# Patient Record
Sex: FEMALE | Race: BLACK OR AFRICAN AMERICAN | Employment: OTHER | URBAN - METROPOLITAN AREA
[De-identification: names, ages, dates, MRNs, and addresses within clinical notes are randomized per-mention and may not be internally consistent; named-entity substitution may affect disease eponyms.]

---

## 2021-01-29 ENCOUNTER — HOSPITAL ENCOUNTER (OUTPATIENT)
Dept: LAB | Age: 51
Discharge: HOME | End: 2021-01-29
Payer: OTHER GOVERNMENT

## 2021-01-29 ENCOUNTER — TRANSCRIBE ORDER (OUTPATIENT)
Dept: REGISTRATION | Age: 51
End: 2021-01-29

## 2021-01-29 ENCOUNTER — HOSPITAL ENCOUNTER (OUTPATIENT)
Dept: GENERAL RADIOLOGY | Age: 51
Discharge: HOME | End: 2021-01-29
Payer: OTHER GOVERNMENT

## 2021-01-29 DIAGNOSIS — Z00.00 ROUTINE GENERAL MEDICAL EXAMINATION AT A HEALTH CARE FACILITY: ICD-10-CM

## 2021-01-29 DIAGNOSIS — Z00.00 ROUTINE GENERAL MEDICAL EXAMINATION AT A HEALTH CARE FACILITY: Primary | ICD-10-CM

## 2021-01-29 LAB
25(OH)D3 SERPL-MCNC: 30.8 NG/ML (ref 30–100)
ALBUMIN SERPL-MCNC: 3.9 G/DL (ref 3.4–5)
ALBUMIN/GLOB SERPL: 1 {RATIO} (ref 0.8–1.7)
ALP SERPL-CCNC: 41 U/L (ref 45–117)
ALT SERPL-CCNC: 19 U/L (ref 13–56)
ANION GAP SERPL CALC-SCNC: 2 MMOL/L (ref 3–18)
APPEARANCE UR: ABNORMAL
AST SERPL-CCNC: 10 U/L (ref 10–38)
BACTERIA URNS QL MICRO: ABNORMAL /HPF
BILIRUB SERPL-MCNC: 0.3 MG/DL (ref 0.2–1)
BILIRUB UR QL: NEGATIVE
BUN SERPL-MCNC: 16 MG/DL (ref 7–18)
BUN/CREAT SERPL: 22 (ref 12–20)
CALCIUM SERPL-MCNC: 9 MG/DL (ref 8.5–10.1)
CHLORIDE SERPL-SCNC: 104 MMOL/L (ref 100–111)
CHOLEST SERPL-MCNC: 208 MG/DL
CO2 SERPL-SCNC: 30 MMOL/L (ref 21–32)
COLOR UR: YELLOW
CREAT SERPL-MCNC: 0.72 MG/DL (ref 0.6–1.3)
EPITH CASTS URNS QL MICRO: ABNORMAL /LPF (ref 0–5)
ERYTHROCYTE [DISTWIDTH] IN BLOOD BY AUTOMATED COUNT: 12.8 % (ref 11.6–14.5)
GLOBULIN SER CALC-MCNC: 4 G/DL (ref 2–4)
GLUCOSE SERPL-MCNC: 83 MG/DL (ref 74–99)
GLUCOSE UR STRIP.AUTO-MCNC: NEGATIVE MG/DL
HBA1C MFR BLD: 5.4 % (ref 4.2–5.6)
HCT VFR BLD AUTO: 37.6 % (ref 35–45)
HDLC SERPL-MCNC: 80 MG/DL (ref 40–60)
HDLC SERPL: 2.6 {RATIO} (ref 0–5)
HGB BLD-MCNC: 11.8 G/DL (ref 12–16)
HGB UR QL STRIP: NEGATIVE
KETONES UR QL STRIP.AUTO: ABNORMAL MG/DL
LDLC SERPL CALC-MCNC: 117 MG/DL (ref 0–100)
LEUKOCYTE ESTERASE UR QL STRIP.AUTO: ABNORMAL
LIPID PROFILE,FLP: ABNORMAL
MCH RBC QN AUTO: 29.6 PG (ref 24–34)
MCHC RBC AUTO-ENTMCNC: 31.4 G/DL (ref 31–37)
MCV RBC AUTO: 94.5 FL (ref 74–97)
NITRITE UR QL STRIP.AUTO: NEGATIVE
PH UR STRIP: 6 [PH] (ref 5–8)
PLATELET # BLD AUTO: 256 K/UL (ref 135–420)
PMV BLD AUTO: 10.7 FL (ref 9.2–11.8)
POTASSIUM SERPL-SCNC: 4.5 MMOL/L (ref 3.5–5.5)
PROT SERPL-MCNC: 7.9 G/DL (ref 6.4–8.2)
PROT UR STRIP-MCNC: NEGATIVE MG/DL
RBC # BLD AUTO: 3.98 M/UL (ref 4.2–5.3)
RBC #/AREA URNS HPF: ABNORMAL /HPF (ref 0–5)
SODIUM SERPL-SCNC: 136 MMOL/L (ref 136–145)
SP GR UR REFRACTOMETRY: 1.02 (ref 1–1.03)
TRIGL SERPL-MCNC: 55 MG/DL (ref ?–150)
TSH SERPL DL<=0.05 MIU/L-ACNC: 0.69 UIU/ML (ref 0.36–3.74)
UROBILINOGEN UR QL STRIP.AUTO: 0.2 EU/DL (ref 0.2–1)
VIT B12 SERPL-MCNC: 431 PG/ML (ref 211–911)
VLDLC SERPL CALC-MCNC: 11 MG/DL
WBC # BLD AUTO: 4.4 K/UL (ref 4.6–13.2)
WBC URNS QL MICRO: ABNORMAL /HPF (ref 0–4)

## 2021-01-29 PROCEDURE — 80061 LIPID PANEL: CPT

## 2021-01-29 PROCEDURE — 36415 COLL VENOUS BLD VENIPUNCTURE: CPT

## 2021-01-29 PROCEDURE — 71046 X-RAY EXAM CHEST 2 VIEWS: CPT

## 2021-01-29 PROCEDURE — 82607 VITAMIN B-12: CPT

## 2021-01-29 PROCEDURE — 82306 VITAMIN D 25 HYDROXY: CPT

## 2021-01-29 PROCEDURE — 93005 ELECTROCARDIOGRAM TRACING: CPT

## 2021-01-29 PROCEDURE — 80053 COMPREHEN METABOLIC PANEL: CPT

## 2021-01-29 PROCEDURE — 84443 ASSAY THYROID STIM HORMONE: CPT

## 2021-01-29 PROCEDURE — 83036 HEMOGLOBIN GLYCOSYLATED A1C: CPT

## 2021-01-29 PROCEDURE — 81001 URINALYSIS AUTO W/SCOPE: CPT

## 2021-01-29 PROCEDURE — 85027 COMPLETE CBC AUTOMATED: CPT

## 2021-01-30 LAB
ATRIAL RATE: 56 BPM
CALCULATED P AXIS, ECG09: 74 DEGREES
CALCULATED R AXIS, ECG10: 49 DEGREES
CALCULATED T AXIS, ECG11: 59 DEGREES
DIAGNOSIS, 93000: NORMAL
P-R INTERVAL, ECG05: 160 MS
Q-T INTERVAL, ECG07: 420 MS
QRS DURATION, ECG06: 82 MS
QTC CALCULATION (BEZET), ECG08: 405 MS
VENTRICULAR RATE, ECG03: 56 BPM

## 2023-03-09 ENCOUNTER — APPOINTMENT (OUTPATIENT)
Facility: HOSPITAL | Age: 53
DRG: 281 | End: 2023-03-09
Payer: OTHER GOVERNMENT

## 2023-03-09 ENCOUNTER — HOSPITAL ENCOUNTER (INPATIENT)
Facility: HOSPITAL | Age: 53
LOS: 2 days | Discharge: HOME | DRG: 281 | End: 2023-03-11
Attending: STUDENT IN AN ORGANIZED HEALTH CARE EDUCATION/TRAINING PROGRAM | Admitting: INTERNAL MEDICINE
Payer: OTHER GOVERNMENT

## 2023-03-09 DIAGNOSIS — I21.4 NSTEMI (NON-ST ELEVATED MYOCARDIAL INFARCTION) (HCC): Primary | ICD-10-CM

## 2023-03-09 LAB
ALBUMIN SERPL-MCNC: 3.7 G/DL (ref 3.4–5)
ALBUMIN/GLOB SERPL: 1 (ref 0.8–1.7)
ALP SERPL-CCNC: 47 U/L (ref 45–117)
ALT SERPL-CCNC: 14 U/L (ref 13–56)
ANION GAP SERPL CALC-SCNC: 5 MMOL/L (ref 3–18)
APTT PPP: 26.3 SEC (ref 23–36.4)
AST SERPL-CCNC: 11 U/L (ref 10–38)
BASOPHILS # BLD: 0 K/UL (ref 0–0.1)
BASOPHILS NFR BLD: 0 % (ref 0–2)
BILIRUB SERPL-MCNC: 0.4 MG/DL (ref 0.2–1)
BUN SERPL-MCNC: 19 MG/DL (ref 7–18)
BUN/CREAT SERPL: 23 (ref 12–20)
CALCIUM SERPL-MCNC: 9.2 MG/DL (ref 8.5–10.1)
CHLORIDE SERPL-SCNC: 105 MMOL/L (ref 100–111)
CO2 SERPL-SCNC: 27 MMOL/L (ref 21–32)
CREAT SERPL-MCNC: 0.83 MG/DL (ref 0.6–1.3)
D DIMER PPP FEU-MCNC: 1.06 UG/ML(FEU)
DIFFERENTIAL METHOD BLD: ABNORMAL
EKG ATRIAL RATE: 56 BPM
EKG DIAGNOSIS: NORMAL
EKG P AXIS: 49 DEGREES
EKG P-R INTERVAL: 138 MS
EKG Q-T INTERVAL: 414 MS
EKG QRS DURATION: 82 MS
EKG QTC CALCULATION (BAZETT): 399 MS
EKG R AXIS: 23 DEGREES
EKG T AXIS: 41 DEGREES
EKG VENTRICULAR RATE: 56 BPM
EOSINOPHIL # BLD: 0 K/UL (ref 0–0.4)
EOSINOPHIL NFR BLD: 1 % (ref 0–5)
ERYTHROCYTE [DISTWIDTH] IN BLOOD BY AUTOMATED COUNT: 12.9 % (ref 11.6–14.5)
ERYTHROCYTE [DISTWIDTH] IN BLOOD BY AUTOMATED COUNT: 12.9 % (ref 11.6–14.5)
GLOBULIN SER CALC-MCNC: 3.7 G/DL (ref 2–4)
GLUCOSE BLD STRIP.AUTO-MCNC: 111 MG/DL (ref 70–110)
GLUCOSE SERPL-MCNC: 133 MG/DL (ref 74–99)
HCT VFR BLD AUTO: 37.4 % (ref 35–45)
HCT VFR BLD AUTO: 37.5 % (ref 35–45)
HGB BLD-MCNC: 12.2 G/DL (ref 12–16)
HGB BLD-MCNC: 12.3 G/DL (ref 12–16)
IMM GRANULOCYTES # BLD AUTO: 0 K/UL (ref 0–0.04)
IMM GRANULOCYTES NFR BLD AUTO: 0 % (ref 0–0.5)
LYMPHOCYTES # BLD: 4 K/UL (ref 0.9–3.6)
LYMPHOCYTES NFR BLD: 48 % (ref 21–52)
MAGNESIUM SERPL-MCNC: 1.7 MG/DL (ref 1.6–2.6)
MCH RBC QN AUTO: 29.3 PG (ref 24–34)
MCH RBC QN AUTO: 29.9 PG (ref 24–34)
MCHC RBC AUTO-ENTMCNC: 32.6 G/DL (ref 31–37)
MCHC RBC AUTO-ENTMCNC: 32.8 G/DL (ref 31–37)
MCV RBC AUTO: 89.7 FL (ref 78–100)
MCV RBC AUTO: 91 FL (ref 78–100)
MONOCYTES # BLD: 0.8 K/UL (ref 0.05–1.2)
MONOCYTES NFR BLD: 10 % (ref 3–10)
NEUTS SEG # BLD: 3.4 K/UL (ref 1.8–8)
NEUTS SEG NFR BLD: 41 % (ref 40–73)
NRBC # BLD: 0 K/UL (ref 0–0.01)
NRBC # BLD: 0 K/UL (ref 0–0.01)
NRBC BLD-RTO: 0 PER 100 WBC
NRBC BLD-RTO: 0 PER 100 WBC
PLATELET # BLD AUTO: 240 K/UL (ref 135–420)
PLATELET # BLD AUTO: 295 K/UL (ref 135–420)
PMV BLD AUTO: 10.1 FL (ref 9.2–11.8)
PMV BLD AUTO: 10.2 FL (ref 9.2–11.8)
POTASSIUM SERPL-SCNC: 4 MMOL/L (ref 3.5–5.5)
PROT SERPL-MCNC: 7.4 G/DL (ref 6.4–8.2)
RBC # BLD AUTO: 4.12 M/UL (ref 4.2–5.3)
RBC # BLD AUTO: 4.17 M/UL (ref 4.2–5.3)
SODIUM SERPL-SCNC: 137 MMOL/L (ref 136–145)
TROPONIN I SERPL HS-MCNC: 2157 NG/L (ref 0–54)
TROPONIN I SERPL HS-MCNC: 32 NG/L (ref 0–54)
TROPONIN I SERPL HS-MCNC: 463 NG/L (ref 0–54)
WBC # BLD AUTO: 14 K/UL (ref 4.6–13.2)
WBC # BLD AUTO: 8.2 K/UL (ref 4.6–13.2)

## 2023-03-09 PROCEDURE — 71045 X-RAY EXAM CHEST 1 VIEW: CPT

## 2023-03-09 PROCEDURE — 2500000003 HC RX 250 WO HCPCS: Performed by: STUDENT IN AN ORGANIZED HEALTH CARE EDUCATION/TRAINING PROGRAM

## 2023-03-09 PROCEDURE — 96374 THER/PROPH/DIAG INJ IV PUSH: CPT

## 2023-03-09 PROCEDURE — 93005 ELECTROCARDIOGRAM TRACING: CPT | Performed by: INTERNAL MEDICINE

## 2023-03-09 PROCEDURE — 36415 COLL VENOUS BLD VENIPUNCTURE: CPT

## 2023-03-09 PROCEDURE — 99285 EMERGENCY DEPT VISIT HI MDM: CPT

## 2023-03-09 PROCEDURE — 82962 GLUCOSE BLOOD TEST: CPT

## 2023-03-09 PROCEDURE — 1100000003 HC PRIVATE W/ TELEMETRY

## 2023-03-09 PROCEDURE — 84484 ASSAY OF TROPONIN QUANT: CPT

## 2023-03-09 PROCEDURE — 83735 ASSAY OF MAGNESIUM: CPT

## 2023-03-09 PROCEDURE — 99222 1ST HOSP IP/OBS MODERATE 55: CPT | Performed by: INTERNAL MEDICINE

## 2023-03-09 PROCEDURE — 6360000004 HC RX CONTRAST MEDICATION: Performed by: STUDENT IN AN ORGANIZED HEALTH CARE EDUCATION/TRAINING PROGRAM

## 2023-03-09 PROCEDURE — 6360000002 HC RX W HCPCS: Performed by: INTERNAL MEDICINE

## 2023-03-09 PROCEDURE — 6370000000 HC RX 637 (ALT 250 FOR IP): Performed by: INTERNAL MEDICINE

## 2023-03-09 PROCEDURE — 85730 THROMBOPLASTIN TIME PARTIAL: CPT

## 2023-03-09 PROCEDURE — 85027 COMPLETE CBC AUTOMATED: CPT

## 2023-03-09 PROCEDURE — 6360000002 HC RX W HCPCS: Performed by: STUDENT IN AN ORGANIZED HEALTH CARE EDUCATION/TRAINING PROGRAM

## 2023-03-09 PROCEDURE — 85025 COMPLETE CBC W/AUTO DIFF WBC: CPT

## 2023-03-09 PROCEDURE — 85379 FIBRIN DEGRADATION QUANT: CPT

## 2023-03-09 PROCEDURE — 2580000003 HC RX 258: Performed by: INTERNAL MEDICINE

## 2023-03-09 PROCEDURE — 71275 CT ANGIOGRAPHY CHEST: CPT

## 2023-03-09 PROCEDURE — 80053 COMPREHEN METABOLIC PANEL: CPT

## 2023-03-09 PROCEDURE — 93005 ELECTROCARDIOGRAM TRACING: CPT | Performed by: STUDENT IN AN ORGANIZED HEALTH CARE EDUCATION/TRAINING PROGRAM

## 2023-03-09 PROCEDURE — 6370000000 HC RX 637 (ALT 250 FOR IP): Performed by: STUDENT IN AN ORGANIZED HEALTH CARE EDUCATION/TRAINING PROGRAM

## 2023-03-09 RX ORDER — ATORVASTATIN CALCIUM 40 MG/1
40 TABLET, FILM COATED ORAL NIGHTLY
Status: DISCONTINUED | OUTPATIENT
Start: 2023-03-09 | End: 2023-03-12 | Stop reason: HOSPADM

## 2023-03-09 RX ORDER — ROSUVASTATIN CALCIUM 5 MG/1
TABLET, COATED ORAL
Status: ON HOLD | COMMUNITY
Start: 2022-02-25 | End: 2023-03-11 | Stop reason: HOSPADM

## 2023-03-09 RX ORDER — DOXYCYCLINE 25 MG/5ML
50 POWDER, FOR SUSPENSION ORAL 2 TIMES DAILY
Status: DISCONTINUED | OUTPATIENT
Start: 2023-03-09 | End: 2023-03-09

## 2023-03-09 RX ORDER — ASPIRIN 325 MG
325 TABLET ORAL DAILY
Status: DISCONTINUED | OUTPATIENT
Start: 2023-03-09 | End: 2023-03-09

## 2023-03-09 RX ORDER — NITROGLYCERIN 0.4 MG/1
0.4 TABLET SUBLINGUAL EVERY 5 MIN PRN
Status: DISCONTINUED | OUTPATIENT
Start: 2023-03-09 | End: 2023-03-12 | Stop reason: HOSPADM

## 2023-03-09 RX ORDER — ACETAMINOPHEN 325 MG/1
650 TABLET ORAL EVERY 6 HOURS PRN
Status: DISCONTINUED | OUTPATIENT
Start: 2023-03-09 | End: 2023-03-12 | Stop reason: HOSPADM

## 2023-03-09 RX ORDER — SODIUM CHLORIDE 0.9 % (FLUSH) 0.9 %
5-40 SYRINGE (ML) INJECTION EVERY 12 HOURS SCHEDULED
Status: DISCONTINUED | OUTPATIENT
Start: 2023-03-09 | End: 2023-03-12 | Stop reason: HOSPADM

## 2023-03-09 RX ORDER — DOXYCYCLINE HYCLATE 50 MG/1
50 CAPSULE ORAL EVERY 12 HOURS SCHEDULED
Status: DISCONTINUED | OUTPATIENT
Start: 2023-03-09 | End: 2023-03-09

## 2023-03-09 RX ORDER — METHYLPREDNISOLONE 4 MG/1
TABLET ORAL
COMMUNITY
Start: 2023-02-28

## 2023-03-09 RX ORDER — HEPARIN SODIUM 1000 [USP'U]/ML
60 INJECTION, SOLUTION INTRAVENOUS; SUBCUTANEOUS ONCE
Status: COMPLETED | OUTPATIENT
Start: 2023-03-09 | End: 2023-03-09

## 2023-03-09 RX ORDER — DOXYCYCLINE 100 MG/1
CAPSULE ORAL
COMMUNITY
Start: 2023-02-28

## 2023-03-09 RX ORDER — DOXYCYCLINE HYCLATE 100 MG/1
100 CAPSULE ORAL DAILY
Status: DISCONTINUED | OUTPATIENT
Start: 2023-03-09 | End: 2023-03-12 | Stop reason: HOSPADM

## 2023-03-09 RX ORDER — MORPHINE SULFATE 2 MG/ML
2 INJECTION, SOLUTION INTRAMUSCULAR; INTRAVENOUS EVERY 4 HOURS PRN
Status: DISCONTINUED | OUTPATIENT
Start: 2023-03-09 | End: 2023-03-12 | Stop reason: HOSPADM

## 2023-03-09 RX ORDER — FAMOTIDINE 20 MG/1
20 TABLET, FILM COATED ORAL
Status: DISCONTINUED | OUTPATIENT
Start: 2023-03-09 | End: 2023-03-12 | Stop reason: HOSPADM

## 2023-03-09 RX ORDER — ACETAMINOPHEN 500 MG
1000 TABLET ORAL EVERY 8 HOURS SCHEDULED
Status: DISCONTINUED | OUTPATIENT
Start: 2023-03-09 | End: 2023-03-12 | Stop reason: HOSPADM

## 2023-03-09 RX ORDER — SODIUM CHLORIDE 9 MG/ML
INJECTION, SOLUTION INTRAVENOUS PRN
Status: DISCONTINUED | OUTPATIENT
Start: 2023-03-09 | End: 2023-03-12 | Stop reason: HOSPADM

## 2023-03-09 RX ORDER — KETOROLAC TROMETHAMINE 15 MG/ML
15 INJECTION, SOLUTION INTRAMUSCULAR; INTRAVENOUS ONCE
Status: COMPLETED | OUTPATIENT
Start: 2023-03-09 | End: 2023-03-09

## 2023-03-09 RX ORDER — HEPARIN SODIUM 10000 [USP'U]/100ML
5-30 INJECTION, SOLUTION INTRAVENOUS CONTINUOUS
Status: DISCONTINUED | OUTPATIENT
Start: 2023-03-09 | End: 2023-03-10

## 2023-03-09 RX ORDER — HEPARIN SODIUM 1000 [USP'U]/ML
2000 INJECTION, SOLUTION INTRAVENOUS; SUBCUTANEOUS PRN
Status: DISCONTINUED | OUTPATIENT
Start: 2023-03-09 | End: 2023-03-11 | Stop reason: ALTCHOICE

## 2023-03-09 RX ORDER — POLYETHYLENE GLYCOL 3350 17 G/17G
17 POWDER, FOR SOLUTION ORAL DAILY PRN
Status: DISCONTINUED | OUTPATIENT
Start: 2023-03-09 | End: 2023-03-12 | Stop reason: HOSPADM

## 2023-03-09 RX ORDER — HYDROCODONE BITARTRATE AND ACETAMINOPHEN 5; 325 MG/1; MG/1
TABLET ORAL
COMMUNITY
Start: 2023-02-28

## 2023-03-09 RX ORDER — ONDANSETRON 4 MG/1
4 TABLET, ORALLY DISINTEGRATING ORAL EVERY 8 HOURS PRN
Status: DISCONTINUED | OUTPATIENT
Start: 2023-03-09 | End: 2023-03-12 | Stop reason: HOSPADM

## 2023-03-09 RX ORDER — HEPARIN SODIUM 1000 [USP'U]/ML
4000 INJECTION, SOLUTION INTRAVENOUS; SUBCUTANEOUS PRN
Status: DISCONTINUED | OUTPATIENT
Start: 2023-03-09 | End: 2023-03-11 | Stop reason: ALTCHOICE

## 2023-03-09 RX ORDER — ONDANSETRON 2 MG/ML
4 INJECTION INTRAMUSCULAR; INTRAVENOUS EVERY 6 HOURS PRN
Status: DISCONTINUED | OUTPATIENT
Start: 2023-03-09 | End: 2023-03-12 | Stop reason: HOSPADM

## 2023-03-09 RX ORDER — SODIUM CHLORIDE 0.9 % (FLUSH) 0.9 %
5-40 SYRINGE (ML) INJECTION PRN
Status: DISCONTINUED | OUTPATIENT
Start: 2023-03-09 | End: 2023-03-12 | Stop reason: HOSPADM

## 2023-03-09 RX ORDER — CARVEDILOL 3.12 MG/1
3.12 TABLET ORAL 2 TIMES DAILY WITH MEALS
Status: DISCONTINUED | OUTPATIENT
Start: 2023-03-10 | End: 2023-03-12 | Stop reason: HOSPADM

## 2023-03-09 RX ORDER — ASPIRIN 81 MG/1
81 TABLET, CHEWABLE ORAL DAILY
Status: DISCONTINUED | OUTPATIENT
Start: 2023-03-10 | End: 2023-03-12 | Stop reason: HOSPADM

## 2023-03-09 RX ADMIN — KETOROLAC TROMETHAMINE 15 MG: 15 INJECTION, SOLUTION INTRAMUSCULAR; INTRAVENOUS at 14:02

## 2023-03-09 RX ADMIN — DOXYCYCLINE HYCLATE 100 MG: 100 CAPSULE ORAL at 22:12

## 2023-03-09 RX ADMIN — Medication 0.4 MG: at 17:12

## 2023-03-09 RX ADMIN — IOPAMIDOL 80 ML: 755 INJECTION, SOLUTION INTRAVENOUS at 15:03

## 2023-03-09 RX ADMIN — HEPARIN SODIUM 11 UNITS/KG/HR: 10000 INJECTION, SOLUTION INTRAVENOUS at 23:35

## 2023-03-09 RX ADMIN — HEPARIN SODIUM 11 UNITS/KG/HR: 10000 INJECTION, SOLUTION INTRAVENOUS at 17:16

## 2023-03-09 RX ADMIN — HEPARIN SODIUM 5170 UNITS: 1000 INJECTION INTRAVENOUS; SUBCUTANEOUS at 17:13

## 2023-03-09 RX ADMIN — MORPHINE SULFATE 2 MG: 2 INJECTION, SOLUTION INTRAMUSCULAR; INTRAVENOUS at 22:12

## 2023-03-09 RX ADMIN — FAMOTIDINE 20 MG: 20 TABLET ORAL at 22:11

## 2023-03-09 RX ADMIN — ATORVASTATIN CALCIUM 40 MG: 40 TABLET, FILM COATED ORAL at 22:11

## 2023-03-09 RX ADMIN — ACETAMINOPHEN 1000 MG: 500 TABLET ORAL at 22:11

## 2023-03-09 RX ADMIN — SODIUM CHLORIDE, PRESERVATIVE FREE 10 ML: 5 INJECTION INTRAVENOUS at 22:13

## 2023-03-09 RX ADMIN — ASPIRIN 325 MG ORAL TABLET 325 MG: 325 PILL ORAL at 17:12

## 2023-03-09 ASSESSMENT — LIFESTYLE VARIABLES
HOW OFTEN DO YOU HAVE A DRINK CONTAINING ALCOHOL: NEVER
HOW MANY STANDARD DRINKS CONTAINING ALCOHOL DO YOU HAVE ON A TYPICAL DAY: PATIENT DOES NOT DRINK

## 2023-03-09 ASSESSMENT — PAIN DESCRIPTION - LOCATION
LOCATION: HEAD;NOSE
LOCATION: CHEST

## 2023-03-09 ASSESSMENT — PAIN DESCRIPTION - DESCRIPTORS: DESCRIPTORS: ACHING

## 2023-03-09 ASSESSMENT — PAIN SCALES - GENERAL
PAINLEVEL_OUTOF10: 3
PAINLEVEL_OUTOF10: 7
PAINLEVEL_OUTOF10: 6

## 2023-03-09 ASSESSMENT — PAIN - FUNCTIONAL ASSESSMENT: PAIN_FUNCTIONAL_ASSESSMENT: 0-10

## 2023-03-09 NOTE — ED TRIAGE NOTES
Pt at ENT getting turbids flushed out and started having chest pressure and throat felt like it was closing. Pt A/O x4, no resp distress noted.

## 2023-03-09 NOTE — ED PROVIDER NOTES
[facility] EMERGENCY DEPT  EMERGENCY DEPARTMENT ENCOUNTER      Pt Name: Judi Castrejon  MRN: [id number]  [place] 1970  Date of evaluation: 3/9/2023  Provider: Ren Mckinney MD    CHIEF COMPLAINT       Chief Complaint   Patient presents with    Chest Pain    Oral Swelling         HISTORY OF PRESENT ILLNESS   (Location/Symptom, Timing/Onset, Context/Setting, Quality, Duration, Modifying Factors, Severity)  Note limiting factors. Judi Castrejon is a 50 y.o. female who presents to the emergency department for chest pain. Patient has a having a procedure with ENT today with [facility] to remove some trabeculae from chronic sinus infections. During the procedure she started develop a substernal chest pressure. Does not radiate anywhere. Was worse with deep inspiration. Felt it more towards the left compared to the right. Did have some mild associated shortness of breath. Not sure what makes it better or worse. Did not have anything prior to the procedure. Denies any swelling of lower extremities, history of blood clots or hemoptysis. Denies any recent hormones or birth control. Denies recent fever, sweats or chills. Denies any significant coughing. Is having a sore throat due to the procedure. Was not having 1 prior to. Has not had pain like this before. Nursing Notes were reviewed. REVIEW OF SYSTEMS    (2-9 systems for level 4, 10 or more for level 5)     Constitutional: [no fever]  HENT: [no ear pain]  Eyes: [no change in vision]  Respiratory: Positive for shortness of breath  Cardio: Positive for chest pain  GI: [no blood in stool]  : [no hematuria]  MSK: [no back pain]  Skin: [no rashes]  Neuro: [no headache]    Except as noted above the remainder of the review of systems was reviewed and negative.        PAST MEDICAL HISTORY     Past Medical History:   Diagnosis Date    Sinusitis          SURGICAL HISTORY       Past Surgical History:   Procedure Laterality Date     SECTION      TOTAL HIP ARTHROPLASTY Right          CURRENT MEDICATIONS       Previous Medications    No medications on file       ALLERGIES     Demerol hcl [meperidine]    FAMILY HISTORY     No family history on file. SOCIAL HISTORY       Social History     Socioeconomic History    Marital status:    Tobacco Use    Smoking status: Never    Smokeless tobacco: Never   Substance and Sexual Activity    Alcohol use: No    Drug use: No       SCREENINGS         [place] Coma Scale  Eye Opening: Spontaneous  Best Verbal Response: Oriented  Best Motor Response: Obeys commands  [place] Coma Scale Score: 15                     CIWA Assessment  BP: (!) 125/46  Heart Rate: 60                 PHYSICAL EXAM    (up to 7 for level 4, 8 or more for level 5)     ED Triage Vitals 23 1302   BP Temp Temp Source Heart Rate Resp SpO2 Height Weight   (!) 165/89 97.7 °F (36.5 °C) Oral 56 17 98 % 5' 7\" (1.702 m) 190 lb (86.2 kg)       Physical Exam    General: No acute distress  Head: Normocephalic, atraumatic  Psych: Cooperative and alert  Eyes: No scleral icterus, normal conjunctiva  ENT: Moist oral mucosa a lot of of blood noted in the posterior pharynx without any swelling or exudate, nose is closed with surgical scar, no active bleeding at this time  Neck: Supple  CV: Regular rate and rhythm, no pitting edema, palpable radial pulses  Pulm: Clear breath sounds bilaterally without any wheezing or rhonchi, normal respiratory rate  GI: Normal bowel sounds, soft, non-tender  MSK: Moves all four extremities  Skin: No rashes  Neuro: Alert and conversive      DIAGNOSTIC RESULTS     EKG: All EKG's are interpreted by the Emergency Department Physician who either signs or Co-signs this chart in the absence of a cardiologist.    EKG shows a bradycardic sinus rhythm at a rate of 56 bpm.  QRS is narrow, axis is normal, R wave progression is pericardium is satisfactory.   Does have some Q waves noted in 1 and aVL. No specific ST elevation or depression noted. Overall shows no signs of ACS or arrhythmia. RADIOLOGY:   Non-plain film images such as CT, Ultrasound and MRI are read by the radiologist. Plain radiographic images are visualized and preliminarily interpreted by the emergency physician with the below findings:    Chest x-ray shows no acute cardiopulmonary process per my interpretation. Interpretation per the Radiologist below, if available at the time of this note:    CTA CHEST W WO CONTRAST PE Eval   Final Result      Left greater than right lower lobe patchy groundglass densities, likely   infectious/inflammatory. Consider follow-up CT in 3 months to reassess. No evidence of pulmonary embolus. Prominent central pulmonary arteries may   reflect pulmonary hypertension. XR CHEST PORTABLE   Final Result      No radiographic evidence of acute cardiopulmonary process. Grossly unchanged borderline cardiac silhouette enlargement. ED BEDSIDE ULTRASOUND:   Performed by ED Physician - none    LABS:  Labs Reviewed   COMPREHENSIVE METABOLIC PANEL - Abnormal; Notable for the following components:       Result Value    Glucose 133 (*)     BUN 19 (*)     Bun/Cre Ratio 23 (*)     All other components within normal limits   CBC WITH AUTO DIFFERENTIAL - Abnormal; Notable for the following components:    RBC 4.17 (*)     Absolute Lymph # 4.0 (*)     All other components within normal limits   D-DIMER, QUANTITATIVE - Abnormal; Notable for the following components:    D-Dimer, Quant 1.06 (*)     All other components within normal limits   TROPONIN - Abnormal; Notable for the following components:    Troponin, High Sensitivity 463 (*)     All other components within normal limits   TROPONIN   MAGNESIUM   CBC   APTT   APTT   APTT   TROPONIN       All other labs were within normal range or not returned as of this dictation.     EMERGENCY DEPARTMENT COURSE and DIFFERENTIAL DIAGNOSIS/MDM:   Vitals: Vitals:    03/09/23 1325 03/09/23 1355 03/09/23 1422 03/09/23 1427   BP: 129/86 113/62 (!) 125/46    Pulse: 52 61  60   Resp: 15 21  22   Temp:       TempSrc:       SpO2: 98% 100%  100%   Weight:       Height:           Medical Decision Making  Amount and/or Complexity of Data Reviewed  Labs: ordered. Radiology: ordered. ECG/medicine tests: ordered. Risk  OTC drugs. Prescription drug management. Decision regarding hospitalization. Patient is a 61-year-old female with chest pain during the procedure. Unclear if this is a side effect from the medication she was given or from anxiety from the procedure as it was not a procedure however would like to rule out ACS in this patient. Also describes the pain almost as a pleuritic type pain elderly think she is low risk we will send off a dimer to look for PE. Pain treated with Toradol    EKG and chest x-ray are unremarkable. Upon reexamination patient states that she is feeling much better. She is still having pain but is improved from previous. Initial labs are unremarkable with the exception of an elevated D-dimer therefore we will send off a CTA. Symptoms just started prior to arrival we will send off a repeat troponin. Repeat troponin is significantly uptrending to over 400. This is concerning for an acute coronary event and patient will be diagnosed as an NSTEMI. Considered pros and cons of starting heparin. Since this was a peripheral surgery think that we can attempt to try however she has any bleeding we will stop. She will be given aspirin. We given a dose of nitroglycerin. Patient is admitted to the hospitalist service. Patient is in agreement with the plan to be admitted at this time. All orders moving forward replacement the admitting team.                 CRITICAL CARE TIME   Total Critical Care time was 0 minutes, excluding separately reportable procedures.   There was a high probability of clinically significant/life threatening deterioration in the patient's condition which required my urgent intervention. CONSULTS:  None    PROCEDURES:  Unless otherwise noted below, none     Procedures      FINAL IMPRESSION      1. NSTEMI (non-ST elevated myocardial infarction) [facility])          DISPOSITION/PLAN   DISPOSITION Admitted 03/09/2023 04:49:37 PM      PATIENT REFERRED TO:  No follow-up provider specified. DISCHARGE MEDICATIONS:  New Prescriptions    No medications on file     Controlled Substances Monitoring:     No flowsheet data found.     (Please note that portions of this note were completed with a voice recognition program.  Efforts were made to edit the dictations but occasionally words are mis-transcribed.)    Romel Bansal MD (electronically signed)  Attending Emergency Physician            Romel Bansal MD  03/09/23 3051

## 2023-03-09 NOTE — ED NOTES
TRANSFER - OUT REPORT:    Verbal report given to Soniya Barba RN on Katrina Lindsey  being transferred to [facility]/room [phone] for routine progression of patient care       Report consisted of patient's Situation, Background, Assessment and   Recommendations(SBAR). Information from the following report(s) ED Encounter Summary, ED SBAR, and MAR was reviewed with the receiving nurse. [place] Assessment: Presents to emergency department  because of falls (Syncope, seizure, or loss of consciousness): No, Age > 79: No, Altered Mental Status, Intoxication with alcohol or substance confusion (Disorientation, impaired judgment, poor safety awaremess, or inability to follow instructions): No, Impaired Mobility: Ambulates or transfers with assistive devices or assistance; Unable to ambulate or transer.: No, Nursing Judgement: No  Lines:   Peripheral IV 03/09/23 Right Antecubital (Active)   Site Assessment Clean, dry & intact 03/09/23 1313   Line Status Blood return noted;Specimen collected; Flushed;Normal saline locked 03/09/23 1313   Phlebitis Assessment No symptoms 03/09/23 1313   Infiltration Assessment 0 03/09/23 1313   Dressing Status Clean, dry & intact 03/09/23 1313   Dressing Type Transparent 03/09/23 1313        Opportunity for questions and clarification was provided.       Patient transported with:  Monitor, Heparin drip          Nora Lugo RN  03/09/23 0309

## 2023-03-09 NOTE — ED NOTES
Pt states the pressure in her chest is a \"5\" after one SL nitro     Cindee Opitz, KALYEY  03/09/23 6259

## 2023-03-09 NOTE — Clinical Note
TRANSFER - OUT REPORT:     Verbal report given to: VANNA Vasques. Report consisted of patient's Situation, Background, Assessment and   Recommendations(SBAR). Opportunity for questions and clarification was provided.

## 2023-03-09 NOTE — Clinical Note
TRANSFER - IN REPORT:     Verbal report received from: 190Ruben French . Report consisted of patient's Situation, Background, Assessment and   Recommendations(SBAR). Opportunity for questions and clarification was provided. Assessment completed upon patient's arrival to unit and care assumed.

## 2023-03-09 NOTE — Clinical Note
Contrast Dose Calculator:   Patient's age: 50.   Patient's sex: Female. Patient weight (kg) = 86. Creatinine level (mg/dL) = 0.77. Creatinine clearance (mL/min): 118.67. Max Contrast dose per Creatinine Cl calculator = 267 mL.

## 2023-03-10 ENCOUNTER — APPOINTMENT (OUTPATIENT)
Facility: HOSPITAL | Age: 53
DRG: 281 | End: 2023-03-10
Payer: OTHER GOVERNMENT

## 2023-03-10 LAB
ANION GAP SERPL CALC-SCNC: 5 MMOL/L (ref 3–18)
APTT PPP: 48.6 SEC (ref 23–36.4)
APTT PPP: 66.2 SEC (ref 23–36.4)
BUN SERPL-MCNC: 14 MG/DL (ref 7–18)
BUN/CREAT SERPL: 18 (ref 12–20)
CALCIUM SERPL-MCNC: 9 MG/DL (ref 8.5–10.1)
CHLORIDE SERPL-SCNC: 103 MMOL/L (ref 100–111)
CHOLEST SERPL-MCNC: 144 MG/DL
CO2 SERPL-SCNC: 27 MMOL/L (ref 21–32)
CREAT SERPL-MCNC: 0.77 MG/DL (ref 0.6–1.3)
ECHO AO ROOT DIAM: 2.5 CM
ECHO AO ROOT INDEX: 1.26 CM/M2
ECHO AV AREA PEAK VELOCITY: 2.6 CM2
ECHO AV AREA VTI: 2.6 CM2
ECHO AV AREA/BSA PEAK VELOCITY: 1.3 CM2/M2
ECHO AV AREA/BSA VTI: 1.3 CM2/M2
ECHO AV MEAN GRADIENT: 3 MMHG
ECHO AV MEAN VELOCITY: 0.8 M/S
ECHO AV PEAK GRADIENT: 6 MMHG
ECHO AV PEAK VELOCITY: 1.2 M/S
ECHO AV VELOCITY RATIO: 0.83
ECHO AV VTI: 24.6 CM
ECHO BSA: 2.02 M2
ECHO BSA: 2.02 M2
ECHO EST RA PRESSURE: 3 MMHG
ECHO LA VOL 2C: 24 ML (ref 22–52)
ECHO LA VOL 4C: 16 ML (ref 22–52)
ECHO LA VOL BP: 19 ML (ref 22–52)
ECHO LA VOL/BSA BIPLANE: 10 ML/M2 (ref 16–34)
ECHO LA VOLUME AREA LENGTH: 20 ML
ECHO LA VOLUME INDEX A2C: 12 ML/M2 (ref 16–34)
ECHO LA VOLUME INDEX A4C: 8 ML/M2 (ref 16–34)
ECHO LA VOLUME INDEX AREA LENGTH: 10 ML/M2 (ref 16–34)
ECHO LV E' LATERAL VELOCITY: 12 CM/S
ECHO LV E' SEPTAL VELOCITY: 12 CM/S
ECHO LV FRACTIONAL SHORTENING: 26 % (ref 28–44)
ECHO LV GLOBAL LONGITUDINAL STRAIN (GLS): -24 %
ECHO LV INTERNAL DIMENSION DIASTOLE INDEX: 1.97 CM/M2
ECHO LV INTERNAL DIMENSION DIASTOLIC: 3.9 CM (ref 3.9–5.3)
ECHO LV INTERNAL DIMENSION SYSTOLIC INDEX: 1.46 CM/M2
ECHO LV INTERNAL DIMENSION SYSTOLIC: 2.9 CM
ECHO LV IVSD: 1.3 CM (ref 0.6–0.9)
ECHO LV MASS 2D: 190.4 G (ref 67–162)
ECHO LV MASS INDEX 2D: 96.2 G/M2 (ref 43–95)
ECHO LV POSTERIOR WALL DIASTOLIC: 1.4 CM (ref 0.6–0.9)
ECHO LV RELATIVE WALL THICKNESS RATIO: 0.72
ECHO LVOT AREA: 3.5 CM2
ECHO LVOT AV VTI INDEX: 0.78
ECHO LVOT DIAM: 2.1 CM
ECHO LVOT MEAN GRADIENT: 2 MMHG
ECHO LVOT PEAK GRADIENT: 4 MMHG
ECHO LVOT PEAK VELOCITY: 1 M/S
ECHO LVOT STROKE VOLUME INDEX: 33.4 ML/M2
ECHO LVOT SV: 66.1 ML
ECHO LVOT VTI: 19.1 CM
ECHO MV A VELOCITY: 0.64 M/S
ECHO MV E DECELERATION TIME (DT): 231.9 MS
ECHO MV E VELOCITY: 0.82 M/S
ECHO MV E/A RATIO: 1.28
ECHO MV E/E' LATERAL: 6.83
ECHO MV E/E' RATIO (AVERAGED): 6.83
ECHO MV E/E' SEPTAL: 6.83
ECHO PV MAX VELOCITY: 0.9 M/S
ECHO PV PEAK GRADIENT: 3 MMHG
ECHO RIGHT VENTRICULAR SYSTOLIC PRESSURE (RVSP): 24 MMHG
ECHO RV FREE WALL PEAK S': 16 CM/S
ECHO RV LONGITUDINAL DIMENSION: 2.9 CM
ECHO RV TAPSE: 2.1 CM (ref 1.7–?)
ECHO RVOT PEAK GRADIENT: 1 MMHG
ECHO RVOT PEAK VELOCITY: 0.5 M/S
ECHO TV REGURGITANT MAX VELOCITY: 2.28 M/S
ECHO TV REGURGITANT PEAK GRADIENT: 21 MMHG
EKG ATRIAL RATE: 65 BPM
EKG ATRIAL RATE: 67 BPM
EKG DIAGNOSIS: NORMAL
EKG DIAGNOSIS: NORMAL
EKG P AXIS: 56 DEGREES
EKG P AXIS: 67 DEGREES
EKG P-R INTERVAL: 152 MS
EKG P-R INTERVAL: 152 MS
EKG Q-T INTERVAL: 388 MS
EKG Q-T INTERVAL: 404 MS
EKG QRS DURATION: 84 MS
EKG QRS DURATION: 84 MS
EKG QTC CALCULATION (BAZETT): 409 MS
EKG QTC CALCULATION (BAZETT): 420 MS
EKG R AXIS: 24 DEGREES
EKG R AXIS: 47 DEGREES
EKG T AXIS: 52 DEGREES
EKG T AXIS: 59 DEGREES
EKG VENTRICULAR RATE: 65 BPM
EKG VENTRICULAR RATE: 67 BPM
ERYTHROCYTE [DISTWIDTH] IN BLOOD BY AUTOMATED COUNT: 13.1 % (ref 11.6–14.5)
EST. AVERAGE GLUCOSE BLD GHB EST-MCNC: 111 MG/DL
GLUCOSE BLD STRIP.AUTO-MCNC: 122 MG/DL (ref 70–110)
GLUCOSE SERPL-MCNC: 141 MG/DL (ref 74–99)
HBA1C MFR BLD: 5.5 % (ref 4.2–5.6)
HCT VFR BLD AUTO: 34.5 % (ref 35–45)
HCT VFR BLD AUTO: 38.3 % (ref 35–45)
HDLC SERPL-MCNC: 76 MG/DL (ref 40–60)
HDLC SERPL: 1.9 (ref 0–5)
HGB BLD-MCNC: 11.3 G/DL (ref 12–16)
HGB BLD-MCNC: 12.2 G/DL (ref 12–16)
LDLC SERPL CALC-MCNC: 60.4 MG/DL (ref 0–100)
LIPID PANEL: ABNORMAL
LV EF: 58 %
LVEF MODALITY: ABNORMAL
MCH RBC QN AUTO: 28.8 PG (ref 24–34)
MCHC RBC AUTO-ENTMCNC: 31.9 G/DL (ref 31–37)
MCV RBC AUTO: 90.5 FL (ref 78–100)
NRBC # BLD: 0 K/UL (ref 0–0.01)
NRBC BLD-RTO: 0 PER 100 WBC
NT PRO BNP: 492 PG/ML (ref 0–900)
PLATELET # BLD AUTO: 245 K/UL (ref 135–420)
PMV BLD AUTO: 10.6 FL (ref 9.2–11.8)
POTASSIUM SERPL-SCNC: 3.5 MMOL/L (ref 3.5–5.5)
PROCALCITONIN SERPL-MCNC: <0.05 NG/ML
RBC # BLD AUTO: 4.23 M/UL (ref 4.2–5.3)
SODIUM SERPL-SCNC: 135 MMOL/L (ref 136–145)
TRIGL SERPL-MCNC: 38 MG/DL
TROPONIN I SERPL HS-MCNC: 2055 NG/L (ref 0–54)
TSH SERPL DL<=0.05 MIU/L-ACNC: 0.47 UIU/ML (ref 0.36–3.74)
VLDLC SERPL CALC-MCNC: 7.6 MG/DL
WBC # BLD AUTO: 12.4 K/UL (ref 4.6–13.2)

## 2023-03-10 PROCEDURE — 2580000003 HC RX 258: Performed by: INTERNAL MEDICINE

## 2023-03-10 PROCEDURE — 85730 THROMBOPLASTIN TIME PARTIAL: CPT

## 2023-03-10 PROCEDURE — 99152 MOD SED SAME PHYS/QHP 5/>YRS: CPT | Performed by: INTERNAL MEDICINE

## 2023-03-10 PROCEDURE — 83880 ASSAY OF NATRIURETIC PEPTIDE: CPT

## 2023-03-10 PROCEDURE — 84484 ASSAY OF TROPONIN QUANT: CPT

## 2023-03-10 PROCEDURE — 70450 CT HEAD/BRAIN W/O DYE: CPT

## 2023-03-10 PROCEDURE — 6360000002 HC RX W HCPCS: Performed by: INTERNAL MEDICINE

## 2023-03-10 PROCEDURE — 99222 1ST HOSP IP/OBS MODERATE 55: CPT | Performed by: INTERNAL MEDICINE

## 2023-03-10 PROCEDURE — 2709999900 HC NON-CHARGEABLE SUPPLY: Performed by: INTERNAL MEDICINE

## 2023-03-10 PROCEDURE — 6360000002 HC RX W HCPCS: Performed by: STUDENT IN AN ORGANIZED HEALTH CARE EDUCATION/TRAINING PROGRAM

## 2023-03-10 PROCEDURE — 93458 L HRT ARTERY/VENTRICLE ANGIO: CPT | Performed by: INTERNAL MEDICINE

## 2023-03-10 PROCEDURE — 93306 TTE W/DOPPLER COMPLETE: CPT | Performed by: INTERNAL MEDICINE

## 2023-03-10 PROCEDURE — B2151ZZ FLUOROSCOPY OF LEFT HEART USING LOW OSMOLAR CONTRAST: ICD-10-PCS | Performed by: INTERNAL MEDICINE

## 2023-03-10 PROCEDURE — 80048 BASIC METABOLIC PNL TOTAL CA: CPT

## 2023-03-10 PROCEDURE — 93306 TTE W/DOPPLER COMPLETE: CPT

## 2023-03-10 PROCEDURE — 6370000000 HC RX 637 (ALT 250 FOR IP): Performed by: STUDENT IN AN ORGANIZED HEALTH CARE EDUCATION/TRAINING PROGRAM

## 2023-03-10 PROCEDURE — 93356 MYOCRD STRAIN IMG SPCKL TRCK: CPT | Performed by: INTERNAL MEDICINE

## 2023-03-10 PROCEDURE — 36415 COLL VENOUS BLD VENIPUNCTURE: CPT

## 2023-03-10 PROCEDURE — 84443 ASSAY THYROID STIM HORMONE: CPT

## 2023-03-10 PROCEDURE — 85027 COMPLETE CBC AUTOMATED: CPT

## 2023-03-10 PROCEDURE — 93452 LEFT HRT CATH W/VENTRCLGRPHY: CPT | Performed by: INTERNAL MEDICINE

## 2023-03-10 PROCEDURE — 6360000004 HC RX CONTRAST MEDICATION: Performed by: INTERNAL MEDICINE

## 2023-03-10 PROCEDURE — C1713 ANCHOR/SCREW BN/BN,TIS/BN: HCPCS | Performed by: INTERNAL MEDICINE

## 2023-03-10 PROCEDURE — 2500000003 HC RX 250 WO HCPCS: Performed by: STUDENT IN AN ORGANIZED HEALTH CARE EDUCATION/TRAINING PROGRAM

## 2023-03-10 PROCEDURE — 2500000003 HC RX 250 WO HCPCS: Performed by: INTERNAL MEDICINE

## 2023-03-10 PROCEDURE — 85018 HEMOGLOBIN: CPT

## 2023-03-10 PROCEDURE — B2111ZZ FLUOROSCOPY OF MULTIPLE CORONARY ARTERIES USING LOW OSMOLAR CONTRAST: ICD-10-PCS | Performed by: INTERNAL MEDICINE

## 2023-03-10 PROCEDURE — 82962 GLUCOSE BLOOD TEST: CPT

## 2023-03-10 PROCEDURE — 84145 PROCALCITONIN (PCT): CPT

## 2023-03-10 PROCEDURE — 1100000003 HC PRIVATE W/ TELEMETRY

## 2023-03-10 PROCEDURE — 94761 N-INVAS EAR/PLS OXIMETRY MLT: CPT

## 2023-03-10 PROCEDURE — 80061 LIPID PANEL: CPT

## 2023-03-10 PROCEDURE — 4A023N7 MEASUREMENT OF CARDIAC SAMPLING AND PRESSURE, LEFT HEART, PERCUTANEOUS APPROACH: ICD-10-PCS | Performed by: INTERNAL MEDICINE

## 2023-03-10 PROCEDURE — 6370000000 HC RX 637 (ALT 250 FOR IP): Performed by: INTERNAL MEDICINE

## 2023-03-10 PROCEDURE — 83036 HEMOGLOBIN GLYCOSYLATED A1C: CPT

## 2023-03-10 RX ORDER — NITROGLYCERIN 40 MG/100ML
INJECTION INTRAVENOUS CONTINUOUS PRN
Status: COMPLETED | OUTPATIENT
Start: 2023-03-10 | End: 2023-03-10

## 2023-03-10 RX ORDER — OXYCODONE HYDROCHLORIDE AND ACETAMINOPHEN 5; 325 MG/1; MG/1
1 TABLET ORAL EVERY 4 HOURS PRN
Status: DISCONTINUED | OUTPATIENT
Start: 2023-03-10 | End: 2023-03-12 | Stop reason: HOSPADM

## 2023-03-10 RX ORDER — AMLODIPINE BESYLATE 5 MG/1
5 TABLET ORAL DAILY
Status: DISCONTINUED | OUTPATIENT
Start: 2023-03-10 | End: 2023-03-11

## 2023-03-10 RX ORDER — HEPARIN SODIUM 1000 [USP'U]/ML
INJECTION, SOLUTION INTRAVENOUS; SUBCUTANEOUS PRN
Status: DISCONTINUED | OUTPATIENT
Start: 2023-03-10 | End: 2023-03-10 | Stop reason: HOSPADM

## 2023-03-10 RX ORDER — MIDAZOLAM HYDROCHLORIDE 1 MG/ML
INJECTION INTRAMUSCULAR; INTRAVENOUS PRN
Status: DISCONTINUED | OUTPATIENT
Start: 2023-03-10 | End: 2023-03-10 | Stop reason: HOSPADM

## 2023-03-10 RX ORDER — MORPHINE SULFATE 2 MG/ML
2 INJECTION, SOLUTION INTRAMUSCULAR; INTRAVENOUS EVERY 4 HOURS PRN
Status: DISCONTINUED | OUTPATIENT
Start: 2023-03-10 | End: 2023-03-10 | Stop reason: SDUPTHER

## 2023-03-10 RX ORDER — HEPARIN SODIUM 200 [USP'U]/100ML
INJECTION, SOLUTION INTRAVENOUS CONTINUOUS PRN
Status: COMPLETED | OUTPATIENT
Start: 2023-03-10 | End: 2023-03-10

## 2023-03-10 RX ORDER — IODIXANOL 320 MG/ML
INJECTION, SOLUTION INTRAVASCULAR PRN
Status: DISCONTINUED | OUTPATIENT
Start: 2023-03-10 | End: 2023-03-10 | Stop reason: HOSPADM

## 2023-03-10 RX ORDER — FENTANYL CITRATE 50 UG/ML
INJECTION, SOLUTION INTRAMUSCULAR; INTRAVENOUS PRN
Status: DISCONTINUED | OUTPATIENT
Start: 2023-03-10 | End: 2023-03-10 | Stop reason: HOSPADM

## 2023-03-10 RX ORDER — VERAPAMIL HYDROCHLORIDE 2.5 MG/ML
INJECTION, SOLUTION INTRAVENOUS PRN
Status: DISCONTINUED | OUTPATIENT
Start: 2023-03-10 | End: 2023-03-10 | Stop reason: HOSPADM

## 2023-03-10 RX ADMIN — OXYCODONE HYDROCHLORIDE AND ACETAMINOPHEN 1 TABLET: 5; 325 TABLET ORAL at 19:48

## 2023-03-10 RX ADMIN — FAMOTIDINE 20 MG: 20 TABLET ORAL at 21:11

## 2023-03-10 RX ADMIN — CARVEDILOL 3.12 MG: 3.12 TABLET, FILM COATED ORAL at 09:04

## 2023-03-10 RX ADMIN — ACETAMINOPHEN 650 MG: 325 TABLET ORAL at 03:45

## 2023-03-10 RX ADMIN — ASPIRIN 81 MG: 81 TABLET, CHEWABLE ORAL at 09:04

## 2023-03-10 RX ADMIN — HEPARIN SODIUM 2000 UNITS: 1000 INJECTION INTRAVENOUS; SUBCUTANEOUS at 07:33

## 2023-03-10 RX ADMIN — ONDANSETRON 4 MG: 2 INJECTION INTRAMUSCULAR; INTRAVENOUS at 19:48

## 2023-03-10 RX ADMIN — ATORVASTATIN CALCIUM 40 MG: 40 TABLET, FILM COATED ORAL at 21:11

## 2023-03-10 RX ADMIN — ACETAMINOPHEN 1000 MG: 500 TABLET ORAL at 16:47

## 2023-03-10 RX ADMIN — MORPHINE SULFATE 2 MG: 2 INJECTION, SOLUTION INTRAMUSCULAR; INTRAVENOUS at 03:45

## 2023-03-10 RX ADMIN — ACETAMINOPHEN 1000 MG: 500 TABLET ORAL at 21:11

## 2023-03-10 RX ADMIN — OXYCODONE HYDROCHLORIDE AND ACETAMINOPHEN 1 TABLET: 5; 325 TABLET ORAL at 23:59

## 2023-03-10 RX ADMIN — DOXYCYCLINE HYCLATE 100 MG: 100 CAPSULE ORAL at 17:41

## 2023-03-10 RX ADMIN — SODIUM CHLORIDE, PRESERVATIVE FREE 10 ML: 5 INJECTION INTRAVENOUS at 11:57

## 2023-03-10 RX ADMIN — SODIUM CHLORIDE, PRESERVATIVE FREE 10 ML: 5 INJECTION INTRAVENOUS at 21:17

## 2023-03-10 RX ADMIN — CARVEDILOL 3.12 MG: 3.12 TABLET, FILM COATED ORAL at 17:41

## 2023-03-10 RX ADMIN — ACETAMINOPHEN 650 MG: 325 TABLET ORAL at 11:57

## 2023-03-10 ASSESSMENT — PAIN SCALES - GENERAL
PAINLEVEL_OUTOF10: 6
PAINLEVEL_OUTOF10: 5
PAINLEVEL_OUTOF10: 3
PAINLEVEL_OUTOF10: 3
PAINLEVEL_OUTOF10: 5

## 2023-03-10 ASSESSMENT — PAIN DESCRIPTION - DESCRIPTORS
DESCRIPTORS: ACHING

## 2023-03-10 ASSESSMENT — PAIN DESCRIPTION - LOCATION
LOCATION: HEAD;NOSE
LOCATION: HEAD
LOCATION: NOSE
LOCATION: NOSE
LOCATION: FACE

## 2023-03-10 NOTE — PROGRESS NOTES
Progress Note  Hospitalist Service    Patient: Katrina Lindsey MRN: [id number]   SSN: [id number]  YOB: 1970   Age: 50 y.o. Sex: female      Admit Date: 3/9/2023    LOS: 1 day   Chief Complaint   Patient presents with    Chest Pain    Oral Swelling       Subjective:     Patient seen previous to cardiac cath. Spoke with ENT - Dr. Chanell Royal about possible cath and need for heparin GTT, DAPT if interventional cardiac cath needed to be performed. Dr. Chanell Royal did have some concerns that patient would have bleeding event during cath if anticoagulant needed. Suggested in-house ENT be present at bedside/within building should adverse event occur. Decision made for diagnostic cath to be performed after discussing with cardiology. Objective:     Vitals:  /75   Pulse 88   Temp 98.5 °F (36.9 °C) (Oral)   Resp 18   Ht 5' 7\" (1.702 m)   Wt 190 lb (86.2 kg)   LMP 03/07/2023 (Exact Date)   SpO2 100%   BMI 29.76 kg/m²     Physical Exam:   General appearance: alert, appears stated age, and cooperative  Lungs: clear to auscultation bilaterally  Heart: regular rate and rhythm, S1, S2 normal, no murmur, click, rub or gallop  Abdomen: soft, non-tender; bowel sounds normal; no masses,  no organomegaly  Pulses: 2+ and symmetric  Skin: Skin color, texture, turgor normal. No rashes or lesions  Neuro:  normal without focal findings, mental status, speech normal, alert and oriented x3, MELVI, and reflexes normal and symmetric  Face: nasal packing in bilaterally.      Intake and Output:  Current Shift: 03/10 0701 - 03/10 1900  In: -   Out: 330 [Urine:325]  Last three shifts: 03/08 1901 - 03/10 0700  In: 124.8 [I.V.:124.8]  Out: 250 [Urine:250]    Lab/Data Review:  Recent Results (from the past 12 hour(s))   Transthoracic echocardiogram (TTE) complete with contrast, bubble, strain, and 3D PRN    Collection Time: 03/10/23 10:05 AM   Result Value Ref Range    Body Surface Area 2.02 m2    IVSd 1.3 (A) 0.6 - 0.9 cm    LVIDd 3.9 3.9 - 5.3 cm    LVIDs 2.9 cm    LVOT Diameter 2.1 cm    LVPWd 1.4 (A) 0.6 - 0.9 cm    Global Longitudinal Strain -24.0 %    LVOT Peak Gradient 4 mmHg    LVOT Mean Gradient 2 mmHg    LVOT SV 66.1 ml    LVOT Peak Velocity 1.0 m/s    LVOT VTI 19.1 cm    RV Longitudinal Dimension 2.9 cm    RV Free Wall Peak S' 16 cm/s    RVOT Peak Gradient 1 mmHg    RVOT Peak Velocity 0.5 m/s    LA Volume A/L 20 mL    LA Volume 2C 24 22 - 52 mL    LA Volume 4C 16 (A) 22 - 52 mL    LA Volume BP 19 (A) 22 - 52 mL    AV Area by Peak Velocity 2.6 cm2    AV Area by VTI 2.6 cm2    AV Peak Gradient 6 mmHg    AV Mean Gradient 3 mmHg    AV Peak Velocity 1.2 m/s    AV Mean Velocity 0.8 m/s    AV VTI 24.6 cm    MV A Velocity 0.64 m/s    MV E Wave Deceleration Time 231.9 ms    MV E Velocity 0.82 m/s    LV E' Lateral Velocity 12 cm/s    LV E' Septal Velocity 12 cm/s    PV Peak Gradient 3 mmHg    PV Max Velocity 0.9 m/s    TAPSE 2.1 1.7 cm    TR Peak Gradient 21 mmHg    TR Max Velocity 2.28 m/s    Aortic Root 2.5 cm    Fractional Shortening 2D 26 28 - 44 %    LVIDd Index 1.97 cm/m2    LVIDs Index 1.46 cm/m2    LV RWT Ratio 0.72     LV Mass 2D 190.4 (A) 67 - 162 g    LV Mass 2D Index 96.2 (A) 43 - 95 g/m2    MV E/A 1.28     E/E' Ratio (Averaged) 6.83     E/E' Lateral 6.83     E/E' Septal 6.83     LA Volume Index A/L 10 16 - 34 mL/m2    LVOT Stroke Volume Index 33.4 mL/m2    LVOT Area 3.5 cm2    Ao Root Index 1.26 cm/m2    AV Velocity Ratio 0.83     LVOT:AV VTI Index 0.78     ROSALIA/BSA VTI 1.3 cm2/m2    ROSALIA/BSA Peak Velocity 1.3 cm2/m2    LA Volume Index BP 10 (A) 16 - 34 ml/m2    LA Volume Index 2C 12 (A) 16 - 34 mL/m2    LA Volume Index 4C 8 (A) 16 - 34 mL/m2    Est. RA Pressure 3 mmHg    RVSP 24 mmHg   Cardiac procedure    Collection Time: 03/10/23  2:06 PM   Result Value Ref Range    Body Surface Area 2.02 m2   POCT Glucose    Collection Time: 03/10/23  4:37 PM   Result Value Ref Range    POC Glucose 122 (H) 70 - 110 mg/dL         Key Findings or tests:       Telemetry NONE   Oxygen NONE     Assessment and Plan:     Chest pain during secondary to hypertensive emergency during rhinoplasty - she has persistent elevated troponin, but chest pain resolved. No EKG changes in the ER. Repeat EKG for dynamic changes to be follow-up. She was started on heparin drip per ACS protocol, however she maintained bleeding via her sinus packing at this time. Patient underwent diagnostic cardiac cath with no worrisome findings. Will continue to keep blood pressure between 110-120s overnight. Likely discharge in AM.   Hypertension  Hyperlipidemia  CT chest showed left greater than right lower lobe patchy groundglass density, possible infectious/inflammatory. Chronic sinus infection status post rhinoplasty with bilateral maxillary sinus dilation, treatment of the trabeculae and packing by ENT (Dr. Aguilar Floor [phone]). She recommend patient to continue on doxycycline twice daily, normal saline spray 4 times daily through her nasal packing. Might consider Decadron daily.   Headache         Cleophus DO Deon, hospitalist   March 10, 2023

## 2023-03-10 NOTE — H&P
History & Physical    Patient: Carolyn Ramirez MRN: [id number]  CSN: [id number]    YOB: 1970  Age: 50 y.o. Sex: female      DOA: 3/9/2023  CC: chest pain    PCP: Jonah Hawkins MD       HPI:     Carolyn Ramirez is a 50 y.o. female with medical co-morbidities including hyperlipidemia, chronic sinus infection, presented from her ENT office with chest pain. She reported to undergo rhinoplasty with bilateral maxillary sinus dilation, trimming of trabeculae, and packing with ENT (Dr. Cayetano Chung [phone]). At the end of her procedure, she started to have chest pressure which she describes as if something heavy sitting on her chest.  This chest pressure did not move anywhere. She reported to have worsening sensation with deep breathing. She reported a sensation of hotness throughout her face and body. She also reported that her systolic blood pressure was elevated to 180. She was given nitro sublingual without any resolution. Hence, she was sent to the ED for further evaluation. Patient denied having any similar event in the past.  She denied any close family history of early heart attack. She denies tobacco smoking or illicit drug use. She was recently started on cholesterol medicine. In the [facility] ED, CBC was normal, BMP was normal.  D-dimer 1.06. EKG without any ST changes. Chest x-ray showed no radiographic evidence of cardiopulmonary process. Due to elevated D-dimer, CTA was done which show left greater than right lobe patchy groundglass density likely infectious versus inflammatory. No evidence of PE. On repeat high sensitive troponin 463 which increased from 32. She was started on heparin drip per ER MD for concern of ACS. Aspirin was given. Toradol was given. On arrival to [facility], patient reported to have more bleeding, she also have bleeding to that she had dark of her right eye.   This MD spoke with Dr. Cayetano Chung [phone] with update on patient postsurgical care and bleeding in her right tear duct. Dr. Chanell Royal also explained that due to her packing in her maxillary sinus, the bloody tear duct should be expected. As for her post surgical care, she recommended to start her on doxycycline twice daily, she should have normal saline spray to her nasal packing 4 times daily. She can also have Decadron daily to help with swelling and pain. Review of Systems  GENERAL: No fever, No chill, No malaise   HEENT: No change in vision, no ear ache, tinnitus, no sore throat or sinus congestion. NECK: No pain or stiffness. PULMONARY: No shortness of breath, no cough or wheeze. Cardiovascular: no pnd / orthopnea, no Chest Pain  GASTROINTESTINAL: No abd pain, No nausea/vomiting, No diarrhea, No melena or bright red blood per rectum. GENITOURINARY: No urinary frequency, No urgency or pain with urination. MUSCULOSKELETAL: No joint or muscle pain, no back pain, no recent trauma. DERMATOLOGIC: No rash, no itching, no lesions. ENDOCRINE: No polyuria, polydipsia, NO heat or cold intolerance. No recent change in weight. HEMATOLOGICAL: No easy bruising or bleeding. NEUROLOGIC: No headache, No seizures, No generalized weakness         Past Medical History:   Diagnosis Date    Hyperlipidemia     Sinusitis        Past Surgical History:   Procedure Laterality Date     SECTION      TOTAL HIP ARTHROPLASTY Right        Family History   Problem Relation Age of Onset    Stroke Father        Social History     Socioeconomic History    Marital status:    Tobacco Use    Smoking status: Never    Smokeless tobacco: Never   Substance and Sexual Activity    Alcohol use: No    Drug use: No       Prior to Admission medications    Medication Sig Start Date End Date Taking? Authorizing Provider   rosuvastatin (CRESTOR) 5 MG tablet  22  Yes Historical Provider, MD   methylPREDNISolone (MEDROL DOSEPACK) 4 MG tablet TAKE AS DIRECTED.   START MEDICATION 8 DAYS PRIOR TO SINUS SURGERY. ..  (REFER TO PRESCRIPTION NOTES). 2/28/23   Historical Provider, MD   HYDROcodone-acetaminophen ([address]) 5-325 MG per tablet TAKE 1 OR 2 TABLETS BY MOUTH EVERY 6 HOURS AS NEEDED FOR SEVERE PAIN. STARTING AFTER HIS PROCEDURE 2/28/23   Historical Provider, MD   doxycycline monohydrate (MONODOX) 100 MG capsule TAKE 1 CAPSULE BY MOUTH TWICE A DAY. NOT TO USE MEDICATION WITH SULFULCATE 2/28/23   Historical Provider, MD       Allergies   Allergen Reactions    Demerol Hcl [Meperidine]               Physical Exam:      Visit Vitals  /77   Pulse 95   Temp 98.5 °F (36.9 °C) (Oral)   Resp 16   Ht 5' 7\" (1.702 m)   Wt 190 lb (86.2 kg)   SpO2 95%   BMI 29.76 kg/m²       Physical Exam:  Tele: sinus   General:  Cooperative, Not in acute distress, speaks in full sentence while in bed  HEENT: PERRL, EOMI, supple neck, no JVD, dry oral mucosa  Bilateral nasal packing, she has bloody tear in her right eyes, clear tears on left eye. Cardiovascular: S1S2 regular, no rub/gallop   Pulmonary: + air entry bilaterally, no wheezing, no crackle  GI:  Soft, non tender, non distended, +bs, no guarding   Extremities:  No pedal edema, +distal pulses appreciated   Neuro: AOx3, moving all extremities, no gross deficit. Lab/Data Review:  Labs: Results:       Chemistry Recent Labs     03/09/23  1330      K 4.0      CO2 27   BUN 19*   GLOB 3.7      CBC w/Diff Recent Labs     03/09/23  1330 03/09/23  2055   WBC 8.2 14.0*   RBC 4.17* 4.12*   HGB 12.2 12.3   HCT 37.4 37.5    240      Coagulation Recent Labs     03/09/23  1330   APTT 26.3       Iron/Ferritin No results for input(s): IRON in the last 72 hours. Invalid input(s): TIBCCALC   BNP Invalid input(s): BNPP   Cardiac Enzymes No results for input(s): CPK, LINDSEY in the last 72 hours. Invalid input(s): CKRMB, CKND1, TROIP   Liver Enzymes No results for input(s): TP, ALB in the last 72 hours.     Invalid input(s): TBIL, AP, SGOT, GPT, DBIL Thyroid Studies Lab Results   Component Value Date/Time    TSH 0.69 01/29/2021 12:16 PM            Imaging Reviewed:  CT Result (most recent):  CTA CHEST W WO CONTRAST 03/09/2023    Narrative  EXAMINATION: CTA chest pulmonary    INDICATION: Shortness of breath    COMPARISON: None    TECHNIQUE: CT angiography of the pulmonary arteries without contrast, with 3-D  MIP reformations. All CT scans at this facility are performed using dose  optimization technique as appropriate to a performed exam, to include automated  exposure control, adjustment of the mA and/or kV according to patient size  (including appropriate matching first site specific examinations), or use of  iterative reconstruction technique. FINDINGS:    Pulmonary arteries: No pulmonary artery filling defects identified suggest  pulmonary embolus. Pulmonary artery trunk 3.2 cm caliber and subjectively  prominent vein pulmonary arteries. RV/LV ratio unremarkable. No significant  contrast reflux into IVC. Cardiovascular: Pulmonary arteries as above. Thoracic aorta normal in course and  caliber. Heart size overall normal.    Mediastinum: Imaged thyroid unremarkable. No adenopathy by size criteria. Esophagus nondistended. Small hiatal hernia. Pleura: No pleural effusion. No pneumothorax. Lungs/airways: No suspicious bronchial lesions. Left greater and right lower  lobe patchy groundglass densities. Upper abdomen: No acute findings. Miscellaneous: Superficial soft tissues unremarkable. Bones: No acute osseous findings. Impression  Left greater than right lower lobe patchy groundglass densities, likely  infectious/inflammatory. Consider follow-up CT in 3 months to reassess. No evidence of pulmonary embolus. Prominent central pulmonary arteries may  reflect pulmonary hypertension.         Assessment:   Principal Problem:    NSTEMI (non-ST elevated myocardial infarction) [facility])    Chest pain during her ENT procedure, concern for NSTEMI, currently, she has persistent elevated troponin, but chest pain resolved. No EKG changes in the ER. Repeat EKG for dynamic changes to be follow-up. She was started on heparin drip per ACS protocol, however she maintained bleeding via her sinus packing at this time. Hypertension  Hyperlipidemia  CT chest showed left greater than right lower lobe patchy groundglass density, possible infectious/inflammatory. Chronic sinus infection status post rhinoplasty with bilateral maxillary sinus dilation, treatment of the trabeculae and packing by ENT (Dr. Patricia Butts [phone]). She recommend patient to continue on doxycycline twice daily, normal saline spray 4 times daily through her nasal packing. Might consider Decadron daily. Headache      Plan:     Admit to telemetry to continue close monitoring per AHA guidelines for ACS. She has been given aspirin, will start high intensity statin tonight. Recheck stat troponin, stat CBC to evaluate anemia. Stat EKG to evaluate dynamic EKG changes. Goal systolic BP <454, if her blood pressure allow, then will start her on beta-blocker. Hold her heparin drip for now as she continues to have bleeding risks which make heparin treatment contraindicated in this situation. Cardiology consult to follow-up tomorrow  Echocardiogram check  TSH check, lipid panel check, A1c check for restratification. Appreciate Dr. Patricia Butts for reaching out to help with her postsurgical care. She will be available for further contact if needed.   Pepcid  N.p.o. after midnight  She has headaches at this time, ordered CT head  She can have Tylenol and morphine as needed for pain control        Risk of deterioration:  []Low    [x]Moderate  []High     Prophylaxis:  []Lovenox  []Coumadin  []Hep SQ  [x]SCDs  [x]H2B/PPI     Disposition:  []Home w/ Family   [x] PT,OT,RN   []SNF/LTC   []SAH/Rehab     Discussed Code Status:         [x]Full Code      []DNR ___________________________________________________     Care Plan discussed with:    [x]Patient   [x]Family    []ED Care Manager  [x]ED Doc   [x]Specialist :  Total Time Coordinating Admission:  65    minutes    []Total Critical Care Time:       Annabella Rubio MD  3/9/2023, 9:48 PM

## 2023-03-10 NOTE — PROGRESS NOTES
1935: Pt arrived to the unit via EMS at this time. Resprt received from [facility]. Pt appears to be in stable condition upon assessment. No acute distress noted. Heparin drip verified with off going nurse at this time. Will monitor. 2030: Pt with c/o bleeding excessively from her nose and slightly from her eyes heparin drip turned off at this time. 2045: Dr. Kanika Hoff at the bedside assessing pt. Verbal orders give to d/c drip. Will monitor. Awaiting additional orders from provider. 2130: Pt called RN to bedside for saturated gauze. Pt assited with changing guaze. Nasal packing still intact. Will monitor. 2200: Spoke with Dr. Kanika Hoff regarding the care for this patient. Orders placed in chart. 2220: Spoke with Dr. Kanika Hoff regarding the care for this patient. Orders placed in chart. Will obtain EKG and contact cardiology. Awaiting additional orders from provider.

## 2023-03-10 NOTE — CONSULTS
[facility] - Consult Note    Cardiology consultation request from Dr. Jorja Severance for evaluation and management/treatment of NSTEMI    Date of  Admission: 3/9/2023 12:55 PM   Primary Care Physician:  Seamus Davison MD    Attending Cardiologist: Dr. Alisha Rodriguez:     -NSTEMI; developed CP during ENT procedure, peak troponin 2157 on 3/9/2023  -Hx HLD, on Crestor  -Chronic sinus infection status post rhinoplasty with bilateral maxillary sinus dilation by Dr. Terry Frederick 03/2023    No Primary cardiologist; consult by Dr. Cleo Marroquin:       I saw, evaluated, interviewed and examined the patient personally. Patient was admitted after having chest pressure restarted after her ENT procedure which was done yesterday morning. Did not have any prior history of MI or CHF  Currently pain-free. Patient was found to have a hypertensive urgency after procedure and was transferred to emergency department for chest discomfort and hypertension. She was ruled in with MI.  Currently patient is pain-free. She had some bleeding from the nose overnight. She does not have any chest pain or chest tightness at this present. Hemodynamically stable. No evidence of fluid overload. No obvious rales, edema. Abdomen soft. No murmur noted. EKG reviewed which did not show any obvious ischemic changes  Echo with normal EF no obvious wall motion abnormality    Recommendation:  Patient with chest pressure in the setting of hypertensive urgency after ENT procedure  EKG without dynamic ST change of ischemia. Echo unremarkable as well however patient did have elevated troponin  Non-STEMI type I versus type II  Currently patient is on IV heparin however patient did have some bleeding from nose  Hospitalist team Dr. Josef Sampson discussed with Dr. Terry Frederick about anticoagulation and antiplatelet with non-STEMI. According to ENT team, patient is high risk for recurrent bleed especially after recent surgery.   I had a very lengthy discussion with the patient regarding management plan which includes either medical management or diagnostic coronary angiography or angiographic plus intervention. The risk of recurrent bleeding with loading dose of IV heparin along with dual antiplatelet would be high. After lengthy discussion, patient would like to proceed with diagnostic angiography to evaluate coronary artery. Further plan depending on coronary finding. We may have to stage the procedure depending on cath finding especially with recurrent nasal bleed after surgery. Addendum:  LHC without obstructive CAD  DW primary attending  DC IV heparin  BP Control and risk factor modification  We will be available as needed. Please call with question thank you      Significant time spent in reviewing the case, multiple EMR databases, physician notes, reviewing pertinent labs and imaging studies  I spent significant amount of time for medical decision making and updated history, and other providers assessments as well. I personally agree with the findings as stated and the plan as documented. I saw, examined, and evaluated this patient and performed the substantive portion of the encounter for > 50% of the time including extensive history, physical exam, assessment and plan    Rena Alfaro MD       -Will keep NPO for consideration of cardiac catheterization. Discussed with primary team, would appreciate ENT input regarding antiplatelets/potential DAPT prior to proceeding.  -Will check Echocardiogram for completeness.  -Continued on ASA, Lipitor, Coreg.  -Further recommendations based on test results, hospital course. History of Present Illness: This is a 50 y.o. female admitted for NSTEMI (non-ST elevated myocardial infarction) ([facility] [address] [I21.4]. Patient complains of: chest pain      Sana Weathers is a 50 y.o. female who presented to the hospital c/o chest pain.   Pt reports that she developed chest pressure during her ENT procedure ~10:00 AM yesterday morning. She states that she was initially instructed to take deep breaths but her pain became worse. She reports her BP was up to 180's/100's and she was given SL NTG prior to being transferred to [facility] ER. She reports that she felt like she was \"burning up\" inside, denies diaphoresis. She reports that the pain lasted for another couple hours once arriving to [facility] and returned again while at [facility]. She states that her chest pain/pressure \"settled down\" once arriving to [facility]. She denies recurrence today. She denies Hx HTN. She denies prior Hx similar pain. Denies tobacco/EtOH/illicit drug use. Cardiac risk factors: dyslipidemia      Review of Symptoms:  Except as stated above include:  Constitutional:  As per HPI  Respiratory:  negative  Cardiovascular:  As per HPI  Gastrointestinal: negative  Genitourinary:  negative  Musculoskeletal:  Negative  Neurological:  Negative  Dermatological:  Negative  Endocrinological: Negative  Psychological:  Negative       Past Medical History:     Past Medical History:   Diagnosis Date    Hyperlipidemia     Sinusitis          Social History:     Social History     Socioeconomic History    Marital status:    Tobacco Use    Smoking status: Never    Smokeless tobacco: Never   Substance and Sexual Activity    Alcohol use: No    Drug use: No        Family History:     Family History   Problem Relation Age of Onset    Stroke Father         Medications:      Allergies   Allergen Reactions    Demerol Hcl [Meperidine]         Current Facility-Administered Medications   Medication Dose Route Frequency    nitroGLYCERIN (NITROSTAT) SL tablet 0.4 mg  0.4 mg SubLINGual Q5 Min PRN    heparin (porcine) injection 4,000 Units  4,000 Units IntraVENous PRN    heparin (porcine) injection 2,000 Units  2,000 Units IntraVENous PRN    heparin 25,000 unit in sodium chloride 0.45% 250 mL (premix) infusion  5-30 Units/kg/hr IntraVENous Continuous    atorvastatin (LIPITOR) tablet 40 mg  40 mg Oral Nightly    famotidine (PEPCID) tablet 20 mg  20 mg Oral QHS    sodium chloride flush 0.9 % injection 5-40 mL  5-40 mL IntraVENous 2 times per day    sodium chloride flush 0.9 % injection 5-40 mL  5-40 mL IntraVENous PRN    0.9 % sodium chloride infusion   IntraVENous PRN    ondansetron (ZOFRAN-ODT) disintegrating tablet 4 mg  4 mg Oral Q8H PRN    Or    ondansetron (ZOFRAN) injection 4 mg  4 mg IntraVENous Q6H PRN    polyethylene glycol (GLYCOLAX) packet 17 g  17 g Oral Daily PRN    acetaminophen (TYLENOL) tablet 650 mg  650 mg Oral Q6H PRN    Or    acetaminophen (TYLENOL) suppository 650 mg  650 mg Rectal Q6H PRN    morphine (PF) injection 2 mg  2 mg IntraVENous Q4H PRN    acetaminophen (TYLENOL) tablet 1,000 mg  1,000 mg Oral 3 times per day    aspirin chewable tablet 81 mg  81 mg Oral Daily    doxycycline hyclate (VIBRAMYCIN) capsule 100 mg  100 mg Oral Daily    carvedilol (COREG) tablet 3.125 mg  3.125 mg Oral BID WC         Physical Exam:     Vitals:    03/10/23 0821   BP: 135/79   Pulse: 67   Resp: 18   Temp: 98.2 °F (36.8 °C)   SpO2: 97%       BP Readings from Last 3 Encounters:   03/10/23 135/79     Pulse Readings from Last 3 Encounters:   03/10/23 67     Wt Readings from Last 3 Encounters:   03/09/23 190 lb (86.2 kg)       General:  alert, appears stated age, cooperative, and no distress  Neck:  supple  Lungs:  clear to auscultation bilaterally  Heart:  regular rate and rhythm  Abdomen:  abdomen is soft without significant tenderness, masses, organomegaly or guarding  Extremities:  atraumatic, no edema  Skin: warm and dry  Neuro: alert, oriented x3, affect appropriate, no focal neurological deficits, moves all extremities well, and no involuntary movements  Psych: non focal     Data Review:     Recent Labs     03/09/23  1330 03/09/23  2055 03/10/23  0212   WBC 8.2 14.0* 12.4   HGB 12.2 12.3 12.2   HCT 37.4 37.5 38.3    240 245     Recent Labs     03/09/23  1330 03/10/23  0212    135*   K 4.0 3.5    103   CO2 27 27   BUN 19* 14   CREATININE 0.83 0.77   MG 1.7  --    ALT 14  --        All Cardiac Markers in the last 24 hours:    Lab Results   Component Value Date/Time    TROPHS 2,055 03/10/2023 02:12 AM    TROPHS 2,157 03/09/2023 08:55 PM    TROPHS 463 03/09/2023 03:35 PM     No results found for: BNP    Last Lipid:    Lab Results   Component Value Date/Time    CHOL 144 03/10/2023 02:12 AM    HDL 76 03/10/2023 02:12 AM       Cardiographics:     Encounter Date: 03/09/23   EKG 12 Lead   Result Value    Ventricular Rate 65    Atrial Rate 65    P-R Interval 152    QRS Duration 84    Q-T Interval 404    QTc Calculation (Bazett) 420    P Axis 56    R Axis 24    T Axis 52    Diagnosis Normal sinus rhythm     Xray Result (most recent):  XR CHEST PORTABLE 03/09/2023    Narrative  EXAM: XR CHEST PORTABLE    CLINICAL INDICATION/HISTORY: 52 years Female. cp.  Additional History: None    TECHNIQUE: Frontal view of the chest    COMPARISON: 1/29/2021    FINDINGS:    Overlying garment straps partially obscure assessment of portions of the chest.  Multiple devices project over the patient. The cardiac silhouette is at the  upper limits of normal in size, similar to prior allowing for differences in  technique. Pulmonary vasculature appears within normal limits. No confluent  airspace opacity is appreciated. No definite evidence of pleural effusion or  pneumothorax. No acute osseous abnormality appreciated. Impression  No radiographic evidence of acute cardiopulmonary process. Grossly unchanged borderline cardiac silhouette enlargement.       Signed By: Lolly Moreno PA-C     March 10, 2023

## 2023-03-10 NOTE — PROGRESS NOTES
1935: Pt arrived to the unit via EMS at this time. Resprt received from [facility]. Pt appears to be in stable condition upon assessment. No acute distress noted. Heparin drip verified with off going nurse at this time. Will monitor. 2030: Pt with c/o bleeding excessively from her nose and slightly from her eyes heparin drip turned off at this time. 2045: Dr. Leila Hill at the bedside assessing pt. Verbal orders give to d/c drip. Will monitor. Awaiting additional orders from provider. 2130: Pt called RN to bedside for saturated gauze. Pt assited with changing guaze. Nasal packing still intact. Will monitor. 2200: Spoke with Dr. Leila Hill regarding the care for this patient. Orders placed in chart. 2220: Spoke with Dr. Leila Hill regarding the care for this patient. Orders placed in chart. Will obtain EKG and contact cardiology. Awaiting additional orders from provider. 2300: Spoke with Dr. Kendra Padilla about the care of this pt orders given to restart heparin drip. 2330: Spoke with Dr. Kalyan Roche regarding the changes in patients troponin, no new orders received. Will monitor.

## 2023-03-10 NOTE — PROGRESS NOTES
Follow up HS-tropon increase significantly. Spoke with nursing, patient still has blood in her packing. However, it is not bleeding profusely. Her CBC remains stable. Due to her elevated risk of NSTEMI and potential harm if left untreated. Will resume her on Heparin gtt and closely monitoring   Check state EKG. Spoke with dr. Malorie Gandhi who will help monitor this case overnight.      Aurelio Gabriel MD

## 2023-03-10 NOTE — PROGRESS NOTES
1030-Dr. Susy Harmon called unit and requested that we keep patients blood pressure in the 075'E to 321'V systolic. MD notified of /86 and amlodipine 5 mg is being started. Will continue to monitor.      1200- Dressing change done

## 2023-03-10 NOTE — PROGRESS NOTES
INTERIM UPDATE - 7952 EST on 3/09/2023    Patient's Troponin has increased from 463 ng/L (1523 EST) to 2,157 ng/L (2055 EST). Review of EKGs scanned into EHR shows that there is flattening of T-wave in lateral leads in the most recently obtained EKG relative ot the one. Despite Patient having a recent Nasal Surgery/Procedure and having previously been administered IV Heparin gtt, Patient's H&H improved to 12.3 g/dL from 12.2 g/dL. Plan:  Given this change in EKG with an elevated in Troponin, will RESUME IV Heparin gtt at this time due to the epistaxis of limited clinical significance (per lab results) to the hope of improving cardiac outcomes. Will continue to monitor Troponins and H&Hs. Per Nursing Staff's concerns, Patient was placed on Aspiration Precautions and was ordered to have Head of Bed Elevated. Cardiological services is reportedly being paged at this time to be made aware of this Patient, per Nursing Staff.

## 2023-03-11 VITALS
RESPIRATION RATE: 16 BRPM | BODY MASS INDEX: 29.82 KG/M2 | HEART RATE: 61 BPM | DIASTOLIC BLOOD PRESSURE: 61 MMHG | SYSTOLIC BLOOD PRESSURE: 102 MMHG | OXYGEN SATURATION: 100 % | TEMPERATURE: 98.1 F | HEIGHT: 67 IN | WEIGHT: 190 LBS

## 2023-03-11 LAB
ANION GAP SERPL CALC-SCNC: 5 MMOL/L (ref 3–18)
APTT PPP: 33.7 SEC (ref 23–36.4)
BUN SERPL-MCNC: 12 MG/DL (ref 7–18)
BUN/CREAT SERPL: 16 (ref 12–20)
CALCIUM SERPL-MCNC: 9 MG/DL (ref 8.5–10.1)
CHLORIDE SERPL-SCNC: 105 MMOL/L (ref 100–111)
CO2 SERPL-SCNC: 26 MMOL/L (ref 21–32)
CREAT SERPL-MCNC: 0.77 MG/DL (ref 0.6–1.3)
ERYTHROCYTE [DISTWIDTH] IN BLOOD BY AUTOMATED COUNT: 13.1 % (ref 11.6–14.5)
GLUCOSE SERPL-MCNC: 141 MG/DL (ref 74–99)
HCT VFR BLD AUTO: 34.6 % (ref 35–45)
HCT VFR BLD AUTO: 35.6 % (ref 35–45)
HGB BLD-MCNC: 11.3 G/DL (ref 12–16)
HGB BLD-MCNC: 11.6 G/DL (ref 12–16)
MCH RBC QN AUTO: 29.7 PG (ref 24–34)
MCHC RBC AUTO-ENTMCNC: 32.6 G/DL (ref 31–37)
MCV RBC AUTO: 91.3 FL (ref 78–100)
NRBC # BLD: 0 K/UL (ref 0–0.01)
NRBC BLD-RTO: 0 PER 100 WBC
PLATELET # BLD AUTO: 231 K/UL (ref 135–420)
PMV BLD AUTO: 10.1 FL (ref 9.2–11.8)
POTASSIUM SERPL-SCNC: 3.3 MMOL/L (ref 3.5–5.5)
RBC # BLD AUTO: 3.9 M/UL (ref 4.2–5.3)
SODIUM SERPL-SCNC: 136 MMOL/L (ref 136–145)
WBC # BLD AUTO: 9.3 K/UL (ref 4.6–13.2)

## 2023-03-11 PROCEDURE — 85018 HEMOGLOBIN: CPT

## 2023-03-11 PROCEDURE — 99239 HOSP IP/OBS DSCHRG MGMT >30: CPT | Performed by: STUDENT IN AN ORGANIZED HEALTH CARE EDUCATION/TRAINING PROGRAM

## 2023-03-11 PROCEDURE — 2580000003 HC RX 258: Performed by: INTERNAL MEDICINE

## 2023-03-11 PROCEDURE — 80048 BASIC METABOLIC PNL TOTAL CA: CPT

## 2023-03-11 PROCEDURE — 2580000003 HC RX 258: Performed by: STUDENT IN AN ORGANIZED HEALTH CARE EDUCATION/TRAINING PROGRAM

## 2023-03-11 PROCEDURE — 6370000000 HC RX 637 (ALT 250 FOR IP): Performed by: INTERNAL MEDICINE

## 2023-03-11 PROCEDURE — 85027 COMPLETE CBC AUTOMATED: CPT

## 2023-03-11 PROCEDURE — 36415 COLL VENOUS BLD VENIPUNCTURE: CPT

## 2023-03-11 PROCEDURE — 6370000000 HC RX 637 (ALT 250 FOR IP): Performed by: STUDENT IN AN ORGANIZED HEALTH CARE EDUCATION/TRAINING PROGRAM

## 2023-03-11 PROCEDURE — 6360000002 HC RX W HCPCS: Performed by: STUDENT IN AN ORGANIZED HEALTH CARE EDUCATION/TRAINING PROGRAM

## 2023-03-11 PROCEDURE — 85730 THROMBOPLASTIN TIME PARTIAL: CPT

## 2023-03-11 RX ORDER — SODIUM CHLORIDE 450 MG/100ML
INJECTION, SOLUTION INTRAVENOUS ONCE
Status: COMPLETED | OUTPATIENT
Start: 2023-03-11 | End: 2023-03-11

## 2023-03-11 RX ORDER — ATORVASTATIN CALCIUM 40 MG/1
40 TABLET, FILM COATED ORAL NIGHTLY
Qty: 30 TABLET | Refills: 3 | Status: SHIPPED | OUTPATIENT
Start: 2023-03-11

## 2023-03-11 RX ORDER — SODIUM CHLORIDE 450 MG/100ML
INJECTION, SOLUTION INTRAVENOUS CONTINUOUS
Status: DISCONTINUED | OUTPATIENT
Start: 2023-03-11 | End: 2023-03-11

## 2023-03-11 RX ORDER — POTASSIUM CHLORIDE 7.45 MG/ML
10 INJECTION INTRAVENOUS
Status: DISPENSED | OUTPATIENT
Start: 2023-03-11 | End: 2023-03-11

## 2023-03-11 RX ORDER — CARVEDILOL 3.12 MG/1
3.12 TABLET ORAL 2 TIMES DAILY WITH MEALS
Qty: 60 TABLET | Refills: 3 | Status: SHIPPED | OUTPATIENT
Start: 2023-03-12

## 2023-03-11 RX ORDER — PROMETHAZINE HYDROCHLORIDE 25 MG/1
25 TABLET ORAL EVERY 6 HOURS PRN
Status: DISCONTINUED | OUTPATIENT
Start: 2023-03-11 | End: 2023-03-12 | Stop reason: HOSPADM

## 2023-03-11 RX ORDER — SODIUM CHLORIDE AND POTASSIUM CHLORIDE 150; 450 MG/100ML; MG/100ML
INJECTION, SOLUTION INTRAVENOUS ONCE
Status: DISCONTINUED | OUTPATIENT
Start: 2023-03-11 | End: 2023-03-11

## 2023-03-11 RX ORDER — ONDANSETRON 4 MG/1
4 TABLET, FILM COATED ORAL DAILY PRN
Qty: 30 TABLET | Refills: 0 | Status: SHIPPED | OUTPATIENT
Start: 2023-03-11

## 2023-03-11 RX ADMIN — PROMETHAZINE HYDROCHLORIDE 25 MG: 25 TABLET ORAL at 12:36

## 2023-03-11 RX ADMIN — SODIUM CHLORIDE 1000 ML/HR: 4.5 INJECTION, SOLUTION INTRAVENOUS at 12:37

## 2023-03-11 RX ADMIN — ASPIRIN 81 MG: 81 TABLET, CHEWABLE ORAL at 10:13

## 2023-03-11 RX ADMIN — OXYCODONE HYDROCHLORIDE AND ACETAMINOPHEN 1 TABLET: 5; 325 TABLET ORAL at 03:58

## 2023-03-11 RX ADMIN — SODIUM CHLORIDE, PRESERVATIVE FREE 10 ML: 5 INJECTION INTRAVENOUS at 10:51

## 2023-03-11 RX ADMIN — CARVEDILOL 3.12 MG: 3.12 TABLET, FILM COATED ORAL at 16:46

## 2023-03-11 RX ADMIN — DOXYCYCLINE HYCLATE 100 MG: 100 CAPSULE ORAL at 17:33

## 2023-03-11 RX ADMIN — ACETAMINOPHEN 1000 MG: 500 TABLET ORAL at 06:23

## 2023-03-11 RX ADMIN — POTASSIUM CHLORIDE 10 MEQ: 7.46 INJECTION, SOLUTION INTRAVENOUS at 12:39

## 2023-03-11 RX ADMIN — CARVEDILOL 3.12 MG: 3.12 TABLET, FILM COATED ORAL at 10:47

## 2023-03-11 RX ADMIN — ONDANSETRON 4 MG: 4 TABLET, ORALLY DISINTEGRATING ORAL at 04:01

## 2023-03-11 RX ADMIN — OXYCODONE HYDROCHLORIDE AND ACETAMINOPHEN 1 TABLET: 5; 325 TABLET ORAL at 16:48

## 2023-03-11 ASSESSMENT — PAIN SCALES - GENERAL
PAINLEVEL_OUTOF10: 2
PAINLEVEL_OUTOF10: 5
PAINLEVEL_OUTOF10: 2
PAINLEVEL_OUTOF10: 2

## 2023-03-11 ASSESSMENT — PAIN DESCRIPTION - DESCRIPTORS: DESCRIPTORS: DISCOMFORT

## 2023-03-11 ASSESSMENT — PAIN DESCRIPTION - LOCATION: LOCATION: FACE;NOSE

## 2023-03-11 NOTE — DISCHARGE INSTRUCTIONS
DISCHARGE SUMMARY from Nurse    PATIENT INSTRUCTIONS:    Report the following to your surgeon:  Excessive pain, swelling, redness or odor of or around the surgical area  Temperature over 100.5  Nausea and vomiting lasting longer than 4 hours or if unable to take medications  Any signs of decreased circulation or nerve impairment to extremity: change in color, persistent  numbness, tingling, coldness or increase pain  Any questions    What to do at Home:  Recommended activity: activity as tolerated,     If you experience any of the following symptoms: chest pain, shortness of breath, excessive bleeding, please follow up with your primary care provider or dial 911. *  Please give a list of your current medications to your Primary Care Provider. *  Please update this list whenever your medications are discontinued, doses are      changed, or new medications (including over-the-counter products) are added. *  Please carry medication information at all times in case of emergency situations. These are general instructions for a healthy lifestyle:    No smoking/ No tobacco products/ Avoid exposure to second hand smoke  Surgeon General's Warning:  Quitting smoking now greatly reduces serious risk to your health. Obesity, smoking, and sedentary lifestyle greatly increases your risk for illness    A healthy diet, regular physical exercise & weight monitoring are important for maintaining a healthy lifestyle    You may be retaining fluid if you have a history of heart failure or if you experience any of the following symptoms:  Weight gain of 3 pounds or more overnight or 5 pounds in a week, increased swelling in our hands or feet or shortness of breath while lying flat in bed. Please call your doctor as soon as you notice any of these symptoms; do not wait until your next office visit. The discharge information has been reviewed with the patient. The patient verbalized understanding.   Discharge medications reviewed with the patient and appropriate educational materials and side effects teaching were provided.   ___________________________________________________________________________________________________________________________________

## 2023-03-11 NOTE — DISCHARGE SUMMARY
Discharge Summary    Patient: Aurelia Celaya MRN: [id number]  CSN: [id number]    YOB: 1970  Age: 50 y.o. Sex: female    DOA: 3/9/2023 LOS:  LOS: 2 days   Discharge Date:      Admission Diagnosis: [facility]STEM[facility] (non-ST elevated [facility] [I21.4]    Discharge Diagnosis:  [unfilled]    Discharge Condition: Stable  Discharge Disposition:     PHYSICAL EXAM  Visit Vitals  /61   Pulse 61   Temp 98.1 °F (36.7 °C) (Oral)   Resp 16   Ht 5' 7\" (1.702 m)   Wt 190 lb (86.2 kg)   SpO2 100%   BMI 29.76 kg/m²       General: Alert, cooperative, no acute distress    HEENT: NC, Atraumatic. PERRLA, EOMI. Anicteric sclerae. Lungs:  CTA Bilaterally. No Wheezing/Rhonchi/Rales. Heart:  Regular  rhythm,  No murmur, No Rubs, No Gallops  Abdomen: Soft, Non distended, Non tender. +Bowel sounds, no HSM  Extremities: No c/c/e  Psych:   Good insight. Not anxious or agitated. Neurologic:  CN 2-12 grossly intact, oriented X 3. No acute neurological                                 Deficits,     Hospital Course By Problem:       Consults:     Significant Diagnostic Studies: {diagnostics:25462}    Discharge Medications:     Current Discharge Medication List        START taking these medications    Details   ondansetron (ZOFRAN) 4 MG tablet Take 1 tablet by mouth daily as needed for Nausea or Vomiting  Qty: 30 tablet, Refills: 0      atorvastatin (LIPITOR) 40 MG tablet Take 1 tablet by mouth nightly  Qty: 30 tablet, Refills: 3      carvedilol (COREG) 3.125 MG tablet Take 1 tablet by mouth 2 times daily (with meals)  Qty: 60 tablet, Refills: 3           CONTINUE these medications which have NOT CHANGED    Details   methylPREDNISolone (MEDROL DOSEPACK) 4 MG tablet TAKE AS DIRECTED. START MEDICATION 8 DAYS PRIOR TO SINUS SURGERY. ..  (REFER TO PRESCRIPTION NOTES). HYDROcodone-acetaminophen (NORCO) 5-325 MG per tablet TAKE 1 OR 2 TABLETS BY MOUTH EVERY 6 HOURS AS NEEDED FOR SEVERE PAIN.   STARTING AFTER HIS PROCEDURE      doxycycline monohydrate (MONODOX) 100 MG capsule TAKE 1 CAPSULE BY MOUTH TWICE A DAY.  NOT TO USE MEDICATION WITH SULFULCATE           STOP taking these medications       rosuvastatin (CRESTOR) 5 MG tablet Comments:   Reason for Stopping:               Activity: activity as tolerated    Diet: {diet:30070}    Wound Care: {wound care:32577}    Follow-up: with PCP, Linda Bates MD in 7-10days    Minutes spent on discharge: >30 minutes spent coordinating this discharge (review instructions/follow-up, prescriptions, preparing report for sign off)

## 2023-03-11 NOTE — PROGRESS NOTES
conducted an initial consultation and Spiritual Assessment for Jorge Aguirre, who is a 50 y.o.,female. Patient's Primary Language is: [place]. According to the patient's EMR Amish Affiliation is: Ashishmonico Shahgisselle. The reason the Patient came to the hospital is:   Patient Active Problem List    Diagnosis Date Noted    NSTEMI (non-ST elevated myocardial infarction) ([facility] 03/09/2023        The  provided the following Interventions:  Initiated a relationship of care and support. Listened empathically. Provided information about Spiritual Care Services. Chart reviewed. The following outcomes where achieved:   confirmed Patient's Amish Affiliation. Patient expressed gratitude for 's visit. Assessment:  Patient does not have any Evangelical/cultural needs that will affect patient's preferences in health care. Plan:  Chaplains will continue to follow and will provide pastoral care on an as needed/requested basis.  recommends bedside caregivers page  on duty if patient shows signs of acute spiritual or emotional distress.     [address]   [phone]

## 2023-03-20 NOTE — PROGRESS NOTES
Physician Progress Note      PATIENT:               Paulette Martin  CSN #:                  [id number]  :                       1970  ADMIT DATE:       3/9/2023 12:55 PM  [address] DATE:        3/11/2023 7:30 PM  RESPONDING  PROVIDER #:        Dolores Mares DO          QUERY TEXT:    Pt admitted with Chest Pain  during  rhinoplasty with bilateral maxillary   sinus dilation, treatment of the trabeculae and packing by ENT on 3/9/23  . Pt noted to also have elevated troponins and hypertensive urgency  . If   possible, please document in progress notes and discharge summary if you are   evaluating and/or treating any of the following: The medical record reflects the following:    Risk Factors: 50 y.o. female who presented to the hospital c/o chest pain. Pt   reports that she developed chest pressure during her ENT procedure . She   reports her BP was up to 180's/100's and she was given SL NTG ,denies Hx HTN ,   she felt like she was \"burning up\" inside, denies diaphoresis    Clinical Indicators:  Card PN -peak troponin 2157 ,hypertensive urgency after   procedure ,She was ruled in with MI , Non-STEMI type I versus type II  ,EKG   did not show any obvious ischemic changes ,  -- ECHO  Left Ventricle: Normal left ventricular systolic function with a   visually estimated EF of 55 - 60%. Left ventricle size is normal. Severe   septal thickening. Severe posterior thickening. Normal wall motion. Global   longitudinal strain is normal with a value of -24.0%. Abnormal diastolic   function  --LHC without obstructive CAD  --CXR showed no evidence of fluid overload. -- CTA was done which showed left greater than right lobe patchy groundglass   density likely infectious versus inflammatory.   TROPHS   2,055 03/10/2023 02:12 AM  TROPHS 2,157 2023 08:55 PM  TROPHS 463         2023 03:35 PM    Treatment: Labs carefully monitored ,LHC ,card consult ,IV Heparon ang NTG    gtt , echo and ekg ,doxycycline    Thank you  Emely Lincoln RN CRCR SARI  , [facility] /[facility]/[email or web address]  Options provided:  -- Chest pain due to CAD with unstable angina  -- Chest pain due to NSTEMI  -- Chest pain due to costochondritis  -- Chest pain due to Type 2 MI  -- Chest pain  is due to the ENT procedure  -- Other - I will add my own diagnosis  -- Disagree - Not applicable / Not valid  -- Disagree - Clinically unable to determine / Unknown  -- Refer to Clinical Documentation Reviewer    PROVIDER RESPONSE TEXT:    Patient has Chest pain is ENT due to the procedure. Query created by: Mirella Vick on 3/14/2023 8:23 AM      QUERY TEXT:    Patient admitted with Chest pain secondary to hypertensive emergency during   rhinoplasty and noted to have VS - RR 22 , pulse 95  , /46  LABS -WBCs   14.0 ,. If possible, please document in progress notes and discharge summary   if you are evaluating and/or treating any of the following: The medical record reflects the following:    Risk Factors:  VS - RR 22 , pulse 95  , /46  LABS -WBCs 14.0 ,    Clinical Indicators: 3/10 MD PN  Chest pain  secondary to hypertensive   emergency during rhinoplasty - she has persistent elevated troponin, but chest   pain resolved. -Card PN -Patient was admitted after having chest pressure restarted after her   ENT procedure which was done yesterday morning. Did not have any prior   history of MI or CHF Currently pain-free. Patient was found to have a   hypertensive urgency after procedure and was transferred to emergency   department for chest discomfort and hypertension. She was ruled in with MI.     Treatment: IV Morphine ,IV1/2 NS 1000cc bolus then 100cc/hr , Labs carefully   monitored ,LHC ,card consult , IV verapamill ,IV Heparon ang NTG  gtt , echo   and ekg ,doxycycline s/p rhinoplasty            Thank you  Emely Lincoln RN CRCR SARI [facility] /[email or web address]  Options provided:  -- SIRS of non-infectious origin due to Chest pain secondary to hypertensive   emergency during rhinoplasty   without acute organ dysfunction  -- Other - I will add my own diagnosis  -- Disagree - Not applicable / Not valid  -- Disagree - Clinically unable to determine / Unknown  -- Refer to Clinical Documentation Reviewer    PROVIDER RESPONSE TEXT:    This patient has SIRS of non-infectious origin due to Chest pain secondary to   hypertensive emergency during rhinoplasty without acute organ dysfunction. Query created by:  Mirella Vick on 3/14/2023 9:27 AM      Electronically signed by:  Freya Taylor DO 3/19/2023 8:56 PM

## 2023-05-26 RX ORDER — HYDROCODONE BITARTRATE AND ACETAMINOPHEN 5; 325 MG/1; MG/1
1 TABLET ORAL EVERY 4 HOURS PRN
COMMUNITY
Start: 2018-09-29

## 2023-06-19 ENCOUNTER — OFFICE VISIT (OUTPATIENT)
Age: 53
End: 2023-06-19
Payer: OTHER GOVERNMENT

## 2023-06-19 VITALS
SYSTOLIC BLOOD PRESSURE: 120 MMHG | OXYGEN SATURATION: 93 % | WEIGHT: 193 LBS | HEART RATE: 60 BPM | HEIGHT: 67 IN | DIASTOLIC BLOOD PRESSURE: 80 MMHG | BODY MASS INDEX: 30.29 KG/M2

## 2023-06-19 DIAGNOSIS — I10 ESSENTIAL HYPERTENSION WITH GOAL BLOOD PRESSURE LESS THAN 140/90: Primary | ICD-10-CM

## 2023-06-19 DIAGNOSIS — E78.00 PURE HYPERCHOLESTEROLEMIA: ICD-10-CM

## 2023-06-19 PROCEDURE — 3079F DIAST BP 80-89 MM HG: CPT | Performed by: INTERNAL MEDICINE

## 2023-06-19 PROCEDURE — 99214 OFFICE O/P EST MOD 30 MIN: CPT | Performed by: INTERNAL MEDICINE

## 2023-06-19 PROCEDURE — 3074F SYST BP LT 130 MM HG: CPT | Performed by: INTERNAL MEDICINE

## 2023-06-19 RX ORDER — ATORVASTATIN CALCIUM 20 MG/1
20 TABLET, FILM COATED ORAL DAILY
Qty: 30 TABLET | Refills: 3 | Status: SHIPPED | OUTPATIENT
Start: 2023-06-19

## 2023-06-19 NOTE — PROGRESS NOTES
coronary artery  Continue aggressive risk modification. Images reviewed with patient    Hypertension: Continue carvedilol for /60. Continue same    Hyperlipidemia: We will reduce dose of atorvastatin to 20 daily. Importance of diet and exercise was discussed with patient. This plan was discussed with patient who is in agreement. Thank you for allowing me to participate in patient care. Please feel free to call me if you have any question or concern. Wilber Luther MD  Please note: This document has been produced using voice recognition software. Unrecognized errors in transcription may be present.

## 2023-07-25 RX ORDER — CARVEDILOL 3.12 MG/1
3.12 TABLET ORAL 2 TIMES DAILY WITH MEALS
Qty: 180 TABLET | Refills: 3 | Status: SHIPPED | OUTPATIENT
Start: 2023-07-25

## 2023-09-26 NOTE — ED NOTES
Pt ambulatory to the bathroom with minimal assistance     Eleazar Vu RN  03/09/23 3294 Arm band on/IV intact

## 2023-11-10 RX ORDER — ATORVASTATIN CALCIUM 20 MG/1
20 TABLET, FILM COATED ORAL DAILY
Qty: 120 TABLET | Refills: 1 | Status: SHIPPED | OUTPATIENT
Start: 2023-11-10

## 2024-07-30 RX ORDER — ATORVASTATIN CALCIUM 20 MG/1
20 TABLET, FILM COATED ORAL DAILY
Qty: 120 TABLET | Refills: 2 | Status: SHIPPED | OUTPATIENT
Start: 2024-07-30

## 2024-09-18 RX ORDER — CARVEDILOL 3.12 MG/1
3.12 TABLET ORAL 2 TIMES DAILY WITH MEALS
Qty: 180 TABLET | Refills: 3 | Status: SHIPPED | OUTPATIENT
Start: 2024-09-18

## (undated) PROCEDURE — 4A023N7 MEASUREMENT OF CARDIAC SAMPLING AND PRESSURE, LEFT HEART, PERCUTANEOUS APPROACH: ICD-10-PCS

## (undated) PROCEDURE — B2111ZZ FLUOROSCOPY OF MULTIPLE CORONARY ARTERIES USING LOW OSMOLAR CONTRAST: ICD-10-PCS

## (undated) DEVICE — PACK PROCEDURE SURG VASC CATH 161 MMC LF

## (undated) DEVICE — PROCEDURE KIT FLUID MGMT 10 FR CUST MAINFOLD

## (undated) DEVICE — Device

## (undated) DEVICE — SET FLD ADMIN 3 W STPCOCK FIX FEM L BOR 1IN

## (undated) DEVICE — RADIFOCUS OPTITORQUE ANGIOGRAPHIC CATHETER: Brand: OPTITORQUE

## (undated) DEVICE — PRESSURE MONITORING SET: Brand: TRUWAVE